# Patient Record
Sex: MALE | Race: BLACK OR AFRICAN AMERICAN | Employment: OTHER | URBAN - METROPOLITAN AREA
[De-identification: names, ages, dates, MRNs, and addresses within clinical notes are randomized per-mention and may not be internally consistent; named-entity substitution may affect disease eponyms.]

---

## 2019-05-01 ENCOUNTER — HOSPITAL ENCOUNTER (OUTPATIENT)
Dept: LAB | Age: 69
Discharge: HOME | End: 2019-05-01
Payer: MEDICARE

## 2019-05-01 ENCOUNTER — OFFICE VISIT (OUTPATIENT)
Dept: ONCOLOGY | Age: 69
End: 2019-05-01

## 2019-05-01 ENCOUNTER — HOSPITAL ENCOUNTER (OUTPATIENT)
Dept: ONCOLOGY | Age: 69
Discharge: HOME | End: 2019-05-01

## 2019-05-01 VITALS
WEIGHT: 175 LBS | DIASTOLIC BLOOD PRESSURE: 62 MMHG | HEART RATE: 56 BPM | OXYGEN SATURATION: 99 % | BODY MASS INDEX: 26.52 KG/M2 | RESPIRATION RATE: 16 BRPM | SYSTOLIC BLOOD PRESSURE: 130 MMHG | HEIGHT: 68 IN | TEMPERATURE: 99.1 F

## 2019-05-01 DIAGNOSIS — C61 PROSTATE CANCER (HCC): Primary | ICD-10-CM

## 2019-05-01 DIAGNOSIS — C61 PROSTATE CANCER (HCC): ICD-10-CM

## 2019-05-01 LAB
ALBUMIN SERPL-MCNC: 4.2 G/DL (ref 3.4–5)
ALBUMIN/GLOB SERPL: 1.2 {RATIO} (ref 0.8–1.7)
ALP SERPL-CCNC: 80 U/L (ref 45–117)
ALT SERPL-CCNC: 28 U/L (ref 16–61)
ANION GAP SERPL CALC-SCNC: 8 MMOL/L (ref 3–18)
AST SERPL-CCNC: 30 U/L (ref 15–37)
BASO+EOS+MONOS # BLD AUTO: 0.9 K/UL (ref 0–2.3)
BASO+EOS+MONOS NFR BLD AUTO: 9 % (ref 0.1–17)
BILIRUB SERPL-MCNC: 0.9 MG/DL (ref 0.2–1)
BUN SERPL-MCNC: 15 MG/DL (ref 7–18)
BUN/CREAT SERPL: 13 (ref 12–20)
CALCIUM SERPL-MCNC: 9.1 MG/DL (ref 8.5–10.1)
CHLORIDE SERPL-SCNC: 102 MMOL/L (ref 100–108)
CO2 SERPL-SCNC: 29 MMOL/L (ref 21–32)
CREAT SERPL-MCNC: 1.12 MG/DL (ref 0.6–1.3)
DIFFERENTIAL METHOD BLD: NORMAL
ERYTHROCYTE [DISTWIDTH] IN BLOOD BY AUTOMATED COUNT: 12.1 % (ref 11.5–14.5)
GLOBULIN SER CALC-MCNC: 3.4 G/DL (ref 2–4)
GLUCOSE SERPL-MCNC: 97 MG/DL (ref 74–99)
HCT VFR BLD AUTO: 41.2 % (ref 36–48)
HGB BLD-MCNC: 14 G/DL (ref 12–16)
LYMPHOCYTES # BLD: 3.1 K/UL (ref 1.1–5.9)
LYMPHOCYTES NFR BLD: 30 % (ref 14–44)
MCH RBC QN AUTO: 32.3 PG (ref 25–35)
MCHC RBC AUTO-ENTMCNC: 34 G/DL (ref 31–37)
MCV RBC AUTO: 95.2 FL (ref 78–102)
NEUTS SEG # BLD: 6.3 K/UL (ref 1.8–9.5)
NEUTS SEG NFR BLD: 62 % (ref 40–70)
PLATELET # BLD AUTO: 217 K/UL (ref 140–440)
POTASSIUM SERPL-SCNC: 4.2 MMOL/L (ref 3.5–5.5)
PROT SERPL-MCNC: 7.6 G/DL (ref 6.4–8.2)
RBC # BLD AUTO: 4.33 M/UL (ref 4.1–5.1)
SODIUM SERPL-SCNC: 139 MMOL/L (ref 136–145)
WBC # BLD AUTO: 10.3 K/UL (ref 4.5–13)

## 2019-05-01 PROCEDURE — 36415 COLL VENOUS BLD VENIPUNCTURE: CPT

## 2019-05-01 PROCEDURE — 80053 COMPREHEN METABOLIC PANEL: CPT

## 2019-05-01 PROCEDURE — 84402 ASSAY OF FREE TESTOSTERONE: CPT

## 2019-05-01 NOTE — PATIENT INSTRUCTIONS
Prostate Cancer: Care Instructions Your Care Instructions The prostate gland is a small, walnut-shaped organ that lies just below a man's bladder. It surrounds the urethra, the tube that carries urine from the bladder out of the body through the penis. Prostate cancer is the abnormal growth of cells in the prostate gland. Prostate cancer cells can spread within the prostate, to nearby lymph nodes and other tissues, and to other parts of the body. When the cancer hasn't spread outside the prostate, it is called localized prostate cancer. With localized prostate cancer, your options depend on how likely it is that your cancer will grow. Tests will show if your cancer is likely to grow. · Low-risk cancer isn't likely to grow right away. If your cancer is low-risk, you can choose active surveillance. This means your cancer will be watched closely by your doctor with regular checkups and tests to see if the cancer grows. This choice allows you to delay having surgery or radiation, often for many years. If the cancer grows very slowly, you may never need treatment. · Medium-risk cancer is more likely to grow. Some men with this type of cancer may be able to choose active surveillance. Others may need to choose surgery or radiation. · High-risk cancer is most likely to grow. If you have high-risk cancer, you will likely need to choose surgery or radiation. If your cancer has already spread outside the prostate or to other parts of the body, then you may have other treatments, like chemotherapy or hormone therapy. If you are over age [de-identified] or have other serious health problems, like heart disease, you may choose not to have treatments to cure your cancer. Instead, you can just have treatments to manage your symptoms. This is called watchful waiting. Finding out that you have cancer is scary. You may feel many emotions and may need some help coping.  Seek out family, friends, and counselors for support. You also can do things at home to make yourself feel better while you go through treatment. Call the [facility] (6-316.988.1960) or visit its website at [address] for more information. Follow-up care is a key part of your treatment and safety. Be sure to make and go to all appointments, and call your doctor if you are having problems. It's also a good idea to know your test results and keep a list of the medicines you take. How can you care for yourself at home? · Your doctor will talk to you about your treatment options. You may need to learn more about each of them before you can decide which treatment is best for you. · Take your medicines exactly as prescribed. Call your doctor if you think you are having a problem with your medicine. You will get more details on the specific medicines your doctor prescribes. · Eat healthy food. If you do not feel like eating, try to eat food that has protein and extra calories to keep up your strength and prevent weight loss. Drink liquid meal replacements for extra calories and protein. Try to eat your main meal early. · Take steps to control your stress and workload. Learn relaxation techniques. ? Share your feelings. Stress and tension affect our emotions. By expressing your feelings to others, you may be able to understand and cope with them. ? Consider joining a support group. Talking about a problem with your spouse, a good friend, or other people with similar problems is a good way to reduce tension and stress. ? Express yourself through art. Try writing, crafts, dance, or art to relieve stress. Some dance, writing, or art groups may be available just for people who have cancer. ? Be kind to your body and mind. Getting enough sleep, eating a healthy diet, and taking time to do things you enjoy can contribute to an overall feeling of balance in your life and can help reduce stress. ? Get help if you need it. Discuss your concerns with your doctor or counselor. · Get some physical activity every day, but do not get too tired. Keep doing the hobbies you enjoy as your energy allows. · If you are vomiting or have diarrhea: ? Drink plenty of fluids (enough so that your urine is light yellow or clear like water) to prevent dehydration. Choose water and other caffeine-free clear liquids. If you have kidney, heart, or liver disease and have to limit fluids, talk with your doctor before you increase the amount of fluids you drink. ? When you are able to eat, try clear soups, mild foods, and liquids until all symptoms are gone for 12 to 48 hours. Jell-O, dry toast, crackers, and cooked cereal are also good choices. · If you have not already done so, prepare a list of advance directives. Advance directives are instructions to your doctor and family members about what kind of care you want if you become unable to speak or express yourself. When should you call for help? Call 911 anytime you think you may need emergency care. For example, call if: 
  · You passed out (lost consciousness).  
 Call your doctor now or seek immediate medical care if: 
  · You have new or worse pain.  
  · You have new symptoms, such as a cough, belly pain, vomiting, diarrhea, or a rash.  
  · You have symptoms of a urinary tract infection. For example: ? You have blood or pus in your urine. ? You have pain in your back just below your rib cage. This is called flank pain. ? You have a fever, chills, or body aches. ? It hurts to urinate. ? You have groin or belly pain.  
 Watch closely for changes in your health, and be sure to contact your doctor if: 
  · You have swollen glands in your armpits, groin, or neck.  
  · You have trouble controlling your urine.  
  · You do not get better as expected. Where can you learn more? Go to http://hany-beau.info/. Enter V141 in the search box to learn more about \"Prostate Cancer: Care Instructions. \" Current as of: March 27, 2018 Content Version: 11.9 © 0140-3539 [facility] [place]. Care instructions adapted under license by [employer] (which disclaims liability or warranty for this information). If you have questions about a medical condition or this instruction, always ask your healthcare professional. [place] [address] any warranty or liability for your use of this information.

## 2019-05-01 NOTE — PROGRESS NOTES
Hematology/Oncology Consultation Note    Name: Carla Escort  Date: 2019  : 1950    PCP: Consuelo Arcos MD       Mr. Ranjit Ross  is a 78 y.o. -American man with a history of early stage prostate cancer. He is undergoing active surveillance. Subjective:   Chief complaint: Prostate cancer    History of present illness:  Mr. Ranjit Ross is a 50-year-old -American man who was diagnosed with a T1c, Srikanth 6 prostate cancer in  after he was found to have a PSA of 5.6 ng/mL. At the time of his initial evaluation the patient decided to pursue active surveillance. Initially the patient was reluctant to pursue surveillance biopsy but subsequent underwent an MRI fusion biopsy on 3/1/2018. At the time the pathology revealed adenocarcinoma with a [place] score 3+3. Prostate cancer involving 3 of 12 cores involving 5 to 30% of each core. He continued on active surveillance. A review of his serial PSA test showed that his PSA on 3/17/2015 was 5.8 and his most recent PSA from 3/11/2018 was 7.32 ng/mL. The patient denies having any blood in his urine or stool. There is no evidence of erectile dysfunction. He is physically active and has no other physical complaints. He is here for an assessment and continuation of his active surveillance. He is currently being seen by the radiation oncologist at the [address] [place] [address] as well.   Past Medical History:   Diagnosis Date    Diabetes ([facility] [address]     Elevated prostate specific antigen (PSA)     Elevated PSA     Hematospermia     Normal body mass index (BMI)     Prostate cancer ([facility]) 2015    T1c, Srikanth 6, 1/12 cores, PSA 5.6, Dr. Jaimee Sanchez Prostate induration     Sleep apnea 2016    uses cpap  Q nite       Allergies   Allergen Reactions    Aspirin Other (comments)       Past Surgical History:   Procedure Laterality Date    HX ORTHOPAEDIC Right 2017    Torn Rotator Cuff Right Shoulder    HX UROLOGICAL  2015    PNBx-TRUS Vol 40 cc's, Leland 6,  cores PSA 5.6, Dr. Rey Reeves History     Socioeconomic History    Marital status:      Spouse name: Not on file    Number of children: Not on file    Years of education: Not on file    Highest education level: Not on file   Occupational History    Not on file   Social Needs    Financial resource strain: Not on file    Food insecurity:     Worry: Not on file     Inability: Not on file    Transportation needs:     Medical: Not on file     Non-medical: Not on file   Tobacco Use    Smoking status: Former Smoker     Packs/day: 0.50     Years: 20.00     Pack years: 10.00     Last attempt to quit:      Years since quittin.3    Smokeless tobacco: Never Used    Tobacco comment: quit \"s   Substance and Sexual Activity    Alcohol use: Yes     Alcohol/week: 2.4 oz     Types: 4 Glasses of wine per week     Comment: socially    Drug use: No    Sexual activity: Not on file   Lifestyle    Physical activity:     Days per week: Not on file     Minutes per session: Not on file    Stress: Not on file   Relationships    Social connections:     Talks on phone: Not on file     Gets together: Not on file     Attends Denominational service: Not on file     Active member of club or organization: Not on file     Attends meetings of clubs or organizations: Not on file     Relationship status: Not on file    Intimate partner violence:     Fear of current or ex partner: Not on file     Emotionally abused: Not on file     Physically abused: Not on file     Forced sexual activity: Not on file   Other Topics Concern    Not on file   Social History Narrative    Not on file       Family History   Problem Relation Age of Onset    Diabetes Mother     Hypertension Mother     Cancer Mother         Liver?     Diabetes Father     Hypertension Father        Current Outpatient Medications   Medication Sig Dispense Refill    ascorbic acid, vitamin C, (VITAMIN C) 500 mg tablet 500 mg.      multivitamin (ONE A DAY) tablet Take 1 Tab by mouth daily. Review of Systems    General ROS:The patient has no complaints and there is no physical distress evident. Psychological ROS: patient denies having any psychological symptoms such as hallucinations, depression or anxiety. Ophthalmic ROS:the patient denies having any visual impairment or eye discomfort. ENT ROS: there are no abnormalities reported. Allergy and Immunology ROS:the patient denies having any seasonal allergies or allergies to medications other than those already outlined above. Hematological and Lymphatic ROS: the patient denies having any bruising, bleeding or lymphadenopathy. Endocrine ROS: the patient denies having any heat or cold intolerance. There is no history of diabetes or thyroid disorders. Breast ROS: the patient denies having any history of breast mass, nipple discharge, or lumps. Respiratory ROS:the patient denies having any cough, shortness of breath, or dyspnea on exertion. Cardiovascular ROS: there are no complaints of chest pain, palpitations, chest pounding, or dyspnea on exertion. Gastrointestinal ROS: the patient denies having nausea, emesis, diarrhea, constipation, or blood in the stool. Genito-Urinary ROS: the patient denies having urinary urgency, frequency, or dysuria. He does have a history of prostate cancer diagnosed in 2015 with a [place] score 3+3 and a PSA at diagnosis of 5.6 ng/mL. Musculoskeletal ROS: with the exception of mild arthralgias the patient has no other musculoskeletal complaints. Neurological ROS: the patient denies having any numbness, tingling, or neurologic deficits. Dermatological ROS:patient denies having any unexplained rash, skin ulcerations, or hives.       Objective:     Visit Vitals  /62   Pulse (!) 56   Temp 99.1 °F (37.3 °C) (Oral)   Resp 16   Ht 5' 8\" (1.727 m)   Wt 79.4 kg (175 lb)   SpO2 99%   BMI 26.61 kg/m²        ECOGPS=0; pain score=0/10  Physical Exam:   Gen. Appearance: the patient is in no acute distress. Skin: There is no evidence of bruise or rash. HEENT: The head is normocephalic and atraumatic. The conjunctiva and sclera are clear. Pupils are equal, round, reactive to light, and accommodation. The extraocular movements are intact. ENT reveals no oral mucosal lesions or ulcerations. Neck: Supple without lymphadenopathy or thyromegaly. Lungs: Clear to auscultation and percussion; there are no wheezes or rhonchi. Heart: Regular rate and rhythm; there are no murmurs, gallops, or rubs. Abdomen: Bowel sounds are present and normal.  There is no guarding, tenderness, or hepatosplenomegaly. Extremities: There is no clubbing, cyanosis, or edema. Neurologic: There are no focal neurologic deficits. Lymphatics: There is no palpable peripheral lymphadenopathy. Lab data:  Lab tests from 3/11/2018 shows that the most recent PSA was 7.32 ng/mL. The PSA from 9/17/2018 was 7.93 ng/mL. The biopsy from 5/2015 with PSA of 5.6 ng/mL showed that he had a T1c       NxMx, [place] score 3.3 adenocarcinoma the prostate 1 of 12 cores were positive with involving at least 10% of that core. Surveillance MRI fusion biopsy on 3/1/2018 at a time when the PSA was 6.2 ng/mL revealed a Srikanth score 3.3 and 4 of 12 cores involving 5 to 30% of the cores. Assessment:   H0wOaHf, Srikanth scores 6 adenocarcinoma the prostate: The patient has been undergoing active surveillance since his initial diagnosis. Plan:   T1c adenocarcinoma the prostate, [place] score 6: I have recommended that we continue the active surveillance. Additional tests at this time will be a free and total testosterone level. The patient will follow-up in clinic in 2 months with plans to have PSA test every 3 to 4 months as part of his active surveillance strategy.   The patient understands that if there is a progression significantly in his PSA doubling time or any evidence of metastatic disease then we will consider therapeutic intervention. Follow-up in 2 months for    Orders Placed This Encounter    COMPLETE CBC & AUTO DIFF WBC    METABOLIC PANEL, COMPREHENSIVE     Standing Status:   Future     Number of Occurrences:   1     Standing Expiration Date:   5/1/2020    TESTOSTERONE, FREE+TOTAL     Standing Status:   Future     Number of Occurrences:   1     Standing Expiration Date:   5/1/2020    InHouse CBC ([facility])     Standing Status:   Future     Number of Occurrences:   1     Standing Expiration Date:   5/8/2019           Jesus Goodman MD  5/1/2019      Please note: This document has been produced using voice recognition software. Unrecognized errors in transcription may be present.

## 2019-05-02 PROBLEM — M25.519 SHOULDER JOINT PAIN: Status: ACTIVE | Noted: 2019-05-02

## 2019-05-02 PROBLEM — R21 RASH AND OTHER NONSPECIFIC SKIN ERUPTION: Status: ACTIVE | Noted: 2019-05-02

## 2019-05-02 PROBLEM — J06.9 ACUTE UPPER RESPIRATORY INFECTION, UNSPECIFIED: Status: ACTIVE | Noted: 2019-05-02

## 2019-05-02 PROBLEM — M75.101 ROTATOR CUFF TEAR ARTHROPATHY OF RIGHT SHOULDER: Status: ACTIVE | Noted: 2017-05-12

## 2019-05-02 PROBLEM — M71.9 DISORDER OF BURSAE OF SHOULDER REGION: Status: ACTIVE | Noted: 2019-05-02

## 2019-05-02 PROBLEM — M12.811 ROTATOR CUFF TEAR ARTHROPATHY OF RIGHT SHOULDER: Status: ACTIVE | Noted: 2017-05-12

## 2019-05-02 PROBLEM — M54.50 LOW BACK PAIN: Status: ACTIVE | Noted: 2019-05-02

## 2019-05-02 PROBLEM — R73.09 OTHER ABNORMAL GLUCOSE: Status: ACTIVE | Noted: 2017-05-12

## 2019-05-02 PROBLEM — I49.9 CARDIAC DYSRHYTHMIA: Status: ACTIVE | Noted: 2019-05-02

## 2019-05-02 PROBLEM — L72.3 SEBACEOUS CYST: Status: ACTIVE | Noted: 2019-05-02

## 2019-05-02 PROBLEM — L98.9 DISORDER OF SKIN AND SUBCUTANEOUS TISSUE: Status: ACTIVE | Noted: 2019-05-02

## 2019-05-02 PROBLEM — M79.673 FOOT PAIN: Status: ACTIVE | Noted: 2018-05-14

## 2019-05-02 RX ORDER — ASCORBIC ACID 500 MG
TABLET ORAL
COMMUNITY
Start: 2019-01-07 | End: 2020-12-07 | Stop reason: SDUPTHER

## 2019-05-02 RX ORDER — CODEINE PHOSPHATE AND GUAIFENESIN 10; 100 MG/5ML; MG/5ML
SOLUTION ORAL
COMMUNITY
Start: 2019-01-07 | End: 2020-12-07 | Stop reason: ALTCHOICE

## 2019-05-02 RX ORDER — CHOLECALCIFEROL TAB 125 MCG (5000 UNIT) 125 MCG
TAB ORAL
COMMUNITY
Start: 2019-01-07

## 2019-05-03 LAB
TESTOST FREE SERPL-MCNC: 2.9 PG/ML (ref 6.6–18.1)
TESTOST SERPL-MCNC: 272.7 NG/DL (ref 264–916)

## 2019-07-17 ENCOUNTER — OFFICE VISIT (OUTPATIENT)
Dept: ONCOLOGY | Age: 69
End: 2019-07-17

## 2019-07-17 ENCOUNTER — HOSPITAL ENCOUNTER (OUTPATIENT)
Dept: ONCOLOGY | Age: 69
Discharge: HOME | End: 2019-07-17

## 2019-07-17 VITALS
OXYGEN SATURATION: 96 % | WEIGHT: 178 LBS | HEIGHT: 68 IN | TEMPERATURE: 98 F | RESPIRATION RATE: 16 BRPM | SYSTOLIC BLOOD PRESSURE: 122 MMHG | HEART RATE: 57 BPM | BODY MASS INDEX: 26.98 KG/M2 | DIASTOLIC BLOOD PRESSURE: 61 MMHG

## 2019-07-17 DIAGNOSIS — C61 PROSTATE CANCER (HCC): Primary | ICD-10-CM

## 2019-07-17 DIAGNOSIS — C61 PROSTATE CANCER (HCC): ICD-10-CM

## 2019-07-17 LAB
BASO+EOS+MONOS # BLD AUTO: 0.6 K/UL (ref 0–2.3)
BASO+EOS+MONOS NFR BLD AUTO: 6 % (ref 0.1–17)
DIFFERENTIAL METHOD BLD: NORMAL
ERYTHROCYTE [DISTWIDTH] IN BLOOD BY AUTOMATED COUNT: 11.8 % (ref 11.5–14.5)
HCT VFR BLD AUTO: 40.3 % (ref 36–48)
HGB BLD-MCNC: 13.4 G/DL (ref 12–16)
LYMPHOCYTES # BLD: 2.6 K/UL (ref 1.1–5.9)
LYMPHOCYTES NFR BLD: 28 % (ref 14–44)
MCH RBC QN AUTO: 31.8 PG (ref 25–35)
MCHC RBC AUTO-ENTMCNC: 33.3 G/DL (ref 31–37)
MCV RBC AUTO: 95.7 FL (ref 78–102)
NEUTS SEG # BLD: 6.1 K/UL (ref 1.8–9.5)
NEUTS SEG NFR BLD: 66 % (ref 40–70)
PLATELET # BLD AUTO: 235 K/UL (ref 140–440)
RBC # BLD AUTO: 4.21 M/UL (ref 4.1–5.1)
WBC # BLD AUTO: 9.3 K/UL (ref 4.5–13)

## 2019-07-17 NOTE — PROGRESS NOTES
Hematology/medical oncology progress note    7/17/2019  Gee Barajas  YOB: 1950    PCP: Dr. Richa Miranda    Diagnosis: Prostate cancer    Mr. Edwar Elliott is a 67-year-old -American man who has low-grade prostate cancer. Patient is here today to discuss the findings on his most recent MRI of the prostate gland. I have explained to the patient that I have reviewed the imaging studies from the prostate MRI. This test showed findings consistent with probable prostatitis. The previous biopsy-proven Srikanth score 6 prostate cancer in the right base was not seen on the most recent study due to the low-grade and low volume. The seminal vesicles and neurovascular bundles were unremarkable. Enlarged prostate consistent with benign prostate hypertrophy. There was no pelvic lymphadenopathy. His most recent PSA test from 6/4/2018 was 7 ng/mL. The free PSA was 0.99 ng/mL and the free PSA percent was 14.1. His TSH level was 1.65. I have explained to the patient that no therapeutic intervention is warranted with low-grade well-differentiated low-volume prostate cancer. We will continue to monitor him at 3-month intervals. He had his questions answered to his satisfaction. Total time 25 minutes, greater than 50% of the time was in counseling and coordination of care. Kevin Edwards MD, [address]

## 2019-10-16 ENCOUNTER — OFFICE VISIT (OUTPATIENT)
Dept: ONCOLOGY | Age: 69
End: 2019-10-16

## 2019-10-16 ENCOUNTER — HOSPITAL ENCOUNTER (OUTPATIENT)
Dept: LAB | Age: 69
Discharge: HOME | End: 2019-10-16
Payer: MEDICARE

## 2019-10-16 VITALS
TEMPERATURE: 98.7 F | OXYGEN SATURATION: 98 % | DIASTOLIC BLOOD PRESSURE: 59 MMHG | HEART RATE: 55 BPM | SYSTOLIC BLOOD PRESSURE: 113 MMHG | BODY MASS INDEX: 26.52 KG/M2 | HEIGHT: 68 IN | WEIGHT: 175 LBS | RESPIRATION RATE: 17 BRPM

## 2019-10-16 DIAGNOSIS — C61 PROSTATE CANCER (HCC): Primary | ICD-10-CM

## 2019-10-16 DIAGNOSIS — C61 PROSTATE CANCER (HCC): ICD-10-CM

## 2019-10-16 LAB
ALBUMIN SERPL-MCNC: 4.1 G/DL (ref 3.4–5)
ALBUMIN/GLOB SERPL: 1.3 {RATIO} (ref 0.8–1.7)
ALP SERPL-CCNC: 68 U/L (ref 45–117)
ALT SERPL-CCNC: 21 U/L (ref 16–61)
ANION GAP SERPL CALC-SCNC: 6 MMOL/L (ref 3–18)
AST SERPL-CCNC: 19 U/L (ref 10–38)
BASOPHILS # BLD: 0 K/UL (ref 0–0.1)
BASOPHILS NFR BLD: 0 % (ref 0–2)
BILIRUB SERPL-MCNC: 0.8 MG/DL (ref 0.2–1)
BUN SERPL-MCNC: 16 MG/DL (ref 7–18)
BUN/CREAT SERPL: 14 (ref 12–20)
CALCIUM SERPL-MCNC: 9.1 MG/DL (ref 8.5–10.1)
CHLORIDE SERPL-SCNC: 103 MMOL/L (ref 100–111)
CO2 SERPL-SCNC: 32 MMOL/L (ref 21–32)
CREAT SERPL-MCNC: 1.13 MG/DL (ref 0.6–1.3)
DIFFERENTIAL METHOD BLD: ABNORMAL
EOSINOPHIL # BLD: 0.2 K/UL (ref 0–0.4)
EOSINOPHIL NFR BLD: 1 % (ref 0–5)
ERYTHROCYTE [DISTWIDTH] IN BLOOD BY AUTOMATED COUNT: 12 % (ref 11.6–14.5)
GLOBULIN SER CALC-MCNC: 3.2 G/DL (ref 2–4)
GLUCOSE SERPL-MCNC: 119 MG/DL (ref 74–99)
HCT VFR BLD AUTO: 40.9 % (ref 36–48)
HGB BLD-MCNC: 13.3 G/DL (ref 13–16)
LYMPHOCYTES # BLD: 2.5 K/UL (ref 0.9–3.6)
LYMPHOCYTES NFR BLD: 23 % (ref 21–52)
MCH RBC QN AUTO: 31.4 PG (ref 24–34)
MCHC RBC AUTO-ENTMCNC: 32.5 G/DL (ref 31–37)
MCV RBC AUTO: 96.7 FL (ref 74–97)
MONOCYTES # BLD: 0.6 K/UL (ref 0.05–1.2)
MONOCYTES NFR BLD: 5 % (ref 3–10)
NEUTS SEG # BLD: 7.3 K/UL (ref 1.8–8)
NEUTS SEG NFR BLD: 71 % (ref 40–73)
PLATELET # BLD AUTO: 225 K/UL (ref 135–420)
PMV BLD AUTO: 10.9 FL (ref 9.2–11.8)
POTASSIUM SERPL-SCNC: 4 MMOL/L (ref 3.5–5.5)
PROT SERPL-MCNC: 7.3 G/DL (ref 6.4–8.2)
RBC # BLD AUTO: 4.23 M/UL (ref 4.7–5.5)
SODIUM SERPL-SCNC: 141 MMOL/L (ref 136–145)
WBC # BLD AUTO: 10.6 K/UL (ref 4.6–13.2)

## 2019-10-16 PROCEDURE — 84153 ASSAY OF PSA TOTAL: CPT

## 2019-10-16 PROCEDURE — 36415 COLL VENOUS BLD VENIPUNCTURE: CPT

## 2019-10-16 PROCEDURE — 80053 COMPREHEN METABOLIC PANEL: CPT

## 2019-10-16 PROCEDURE — 85025 COMPLETE CBC W/AUTO DIFF WBC: CPT

## 2019-10-16 NOTE — PROGRESS NOTES
Hematology/medical oncology progress note    10/16/2019  Asa Gutter  YOB: 1950    PCP: Dr. Inocente Lopez    Diagnosis: Prostate cancer    Mr. Raymond Mcdowell is a 65-year-old -American man who has low-grade prostate cancer. Patient is here today to discuss the findings on his most recent MRI of the prostate gland. I have explained to the patient that I have reviewed the imaging studies from the prostate MRI. This test showed findings consistent with probable prostatitis. The previous biopsy-proven Srikanth score 6 prostate cancer in the right base was not seen on the most recent study due to the low-grade and low volume. The seminal vesicles and neurovascular bundles were unremarkable. Enlarged prostate consistent with benign prostate hypertrophy. There was no pelvic lymphadenopathy. His most recent PSA test from 6/4/2018 was 7 ng/mL. The free PSA was 0.99 ng/mL and the free PSA percent was 14.1. His TSH level was 1.65. I have explained to the patient that no therapeutic intervention is warranted with low-grade well-differentiated low-volume prostate cancer. A more recent PSA level from 3/11/2019 showed that his PSA was 7.32 ng/mL. From 5/3/2019 his testosterone level was 272.7 ng/dL. At this time I will repeat his PSA level and will plan to reassess with CT scans to the pelvic area if there is a significant change in his PSA from the values obtained on 3/11/2019. Kevin Jeffery MD, [place]

## 2019-10-17 LAB — PSA SERPL-MCNC: 6.4 NG/ML (ref 0–4)

## 2020-01-29 ENCOUNTER — OFFICE VISIT (OUTPATIENT)
Dept: ONCOLOGY | Age: 70
End: 2020-01-29

## 2020-01-29 ENCOUNTER — HOSPITAL ENCOUNTER (OUTPATIENT)
Dept: LAB | Age: 70
Discharge: HOME | End: 2020-01-29
Payer: MEDICARE

## 2020-01-29 ENCOUNTER — HOSPITAL ENCOUNTER (OUTPATIENT)
Dept: ONCOLOGY | Age: 70
Discharge: HOME | End: 2020-01-29

## 2020-01-29 VITALS
DIASTOLIC BLOOD PRESSURE: 64 MMHG | RESPIRATION RATE: 16 BRPM | BODY MASS INDEX: 26.37 KG/M2 | HEIGHT: 68 IN | OXYGEN SATURATION: 96 % | WEIGHT: 174 LBS | TEMPERATURE: 97.7 F | SYSTOLIC BLOOD PRESSURE: 120 MMHG | HEART RATE: 51 BPM

## 2020-01-29 DIAGNOSIS — C61 PROSTATE CANCER (HCC): Primary | ICD-10-CM

## 2020-01-29 DIAGNOSIS — C61 PROSTATE CANCER (HCC): ICD-10-CM

## 2020-01-29 LAB
ALBUMIN SERPL-MCNC: 3.9 G/DL (ref 3.4–5)
ALBUMIN/GLOB SERPL: 1.1 {RATIO} (ref 0.8–1.7)
ALP SERPL-CCNC: 69 U/L (ref 45–117)
ALT SERPL-CCNC: 21 U/L (ref 16–61)
ANION GAP SERPL CALC-SCNC: 3 MMOL/L (ref 3–18)
AST SERPL-CCNC: 25 U/L (ref 10–38)
BASO+EOS+MONOS # BLD AUTO: 0.7 K/UL (ref 0–2.3)
BASO+EOS+MONOS NFR BLD AUTO: 7 % (ref 0.1–17)
BILIRUB SERPL-MCNC: 0.7 MG/DL (ref 0.2–1)
BUN SERPL-MCNC: 17 MG/DL (ref 7–18)
BUN/CREAT SERPL: 16 (ref 12–20)
CALCIUM SERPL-MCNC: 9.4 MG/DL (ref 8.5–10.1)
CHLORIDE SERPL-SCNC: 106 MMOL/L (ref 100–111)
CO2 SERPL-SCNC: 30 MMOL/L (ref 21–32)
CREAT SERPL-MCNC: 1.05 MG/DL (ref 0.6–1.3)
DIFFERENTIAL METHOD BLD: NORMAL
ERYTHROCYTE [DISTWIDTH] IN BLOOD BY AUTOMATED COUNT: 11.6 % (ref 11.5–14.5)
GLOBULIN SER CALC-MCNC: 3.5 G/DL (ref 2–4)
GLUCOSE SERPL-MCNC: 90 MG/DL (ref 74–99)
HCT VFR BLD AUTO: 39.8 % (ref 36–48)
HGB BLD-MCNC: 13.1 G/DL (ref 12–16)
LYMPHOCYTES # BLD: 2.9 K/UL (ref 1.1–5.9)
LYMPHOCYTES NFR BLD: 29 % (ref 14–44)
MCH RBC QN AUTO: 31.2 PG (ref 25–35)
MCHC RBC AUTO-ENTMCNC: 32.9 G/DL (ref 31–37)
MCV RBC AUTO: 94.8 FL (ref 78–102)
NEUTS SEG # BLD: 6.3 K/UL (ref 1.8–9.5)
NEUTS SEG NFR BLD: 65 % (ref 40–70)
PLATELET # BLD AUTO: 231 K/UL (ref 140–440)
POTASSIUM SERPL-SCNC: 4.3 MMOL/L (ref 3.5–5.5)
PROT SERPL-MCNC: 7.4 G/DL (ref 6.4–8.2)
PSA SERPL-MCNC: 6.9 NG/ML (ref 0–4)
RBC # BLD AUTO: 4.2 M/UL (ref 4.1–5.1)
SODIUM SERPL-SCNC: 139 MMOL/L (ref 136–145)
WBC # BLD AUTO: 9.9 K/UL (ref 4.5–13)

## 2020-01-29 PROCEDURE — 84153 ASSAY OF PSA TOTAL: CPT

## 2020-01-29 PROCEDURE — 80053 COMPREHEN METABOLIC PANEL: CPT

## 2020-01-29 PROCEDURE — 36415 COLL VENOUS BLD VENIPUNCTURE: CPT

## 2020-01-29 PROCEDURE — 84403 ASSAY OF TOTAL TESTOSTERONE: CPT

## 2020-01-29 NOTE — PATIENT INSTRUCTIONS
Prostate Cancer: Care Instructions Your Care Instructions The prostate gland is a small, walnut-shaped organ that lies just below a man's bladder. It surrounds the urethra, the tube that carries urine from the bladder out of the body through the penis. Prostate cancer is the abnormal growth of cells in the prostate gland. Prostate cancer cells can spread within the prostate, to nearby lymph nodes and other tissues, and to other parts of the body. When the cancer hasn't spread outside the prostate, it is called localized prostate cancer. With localized prostate cancer, your options depend on how likely it is that your cancer will grow. Tests will show if your cancer is likely to grow. · Low-risk cancer isn't likely to grow right away. If your cancer is low-risk, you can choose active surveillance. This means your cancer will be watched closely by your doctor with regular checkups and tests to see if the cancer grows. This choice allows you to delay having surgery or radiation, often for many years. If the cancer grows very slowly, you may never need treatment. · Medium-risk cancer is more likely to grow. Some men with this type of cancer may be able to choose active surveillance. Others may need to choose surgery or radiation. · High-risk cancer is most likely to grow. If you have high-risk cancer, you will likely need to choose surgery or radiation. If your cancer has already spread outside the prostate or to other parts of the body, then you may have other treatments, like chemotherapy or hormone therapy. If you are over age [de-identified] or have other serious health problems, like heart disease, you may choose not to have treatments to cure your cancer. Instead, you can just have treatments to manage your symptoms. This is called watchful waiting. Finding out that you have cancer is scary. You may feel many emotions and may need some help coping.  Seek out family, friends, and counselors for support. You also can do things at home to make yourself feel better while you go through treatment. Call the [facility] (9-350.719.4238) or visit its website at [address] for more information. Follow-up care is a key part of your treatment and safety. Be sure to make and go to all appointments, and call your doctor if you are having problems. It's also a good idea to know your test results and keep a list of the medicines you take. How can you care for yourself at home? · Your doctor will talk to you about your treatment options. You may need to learn more about each of them before you can decide which treatment is best for you. · Take your medicines exactly as prescribed. Call your doctor if you think you are having a problem with your medicine. You will get more details on the specific medicines your doctor prescribes. · Eat healthy food. If you do not feel like eating, try to eat food that has protein and extra calories to keep up your strength and prevent weight loss. Drink liquid meal replacements for extra calories and protein. Try to eat your main meal early. · Take steps to control your stress and workload. Learn relaxation techniques. ? Share your feelings. Stress and tension affect our emotions. By expressing your feelings to others, you may be able to understand and cope with them. ? Consider joining a support group. Talking about a problem with your spouse, a good friend, or other people with similar problems is a good way to reduce tension and stress. ? Express yourself through art. Try writing, crafts, dance, or art to relieve stress. Some dance, writing, or art groups may be available just for people who have cancer. ? Be kind to your body and mind. Getting enough sleep, eating a healthy diet, and taking time to do things you enjoy can contribute to an overall feeling of balance in your life and can help reduce stress. ? Get help if you need it. Discuss your concerns with your doctor or counselor. · Get some physical activity every day, but do not get too tired. Keep doing the hobbies you enjoy as your energy allows. · If you are vomiting or have diarrhea: ? Drink plenty of fluids (enough so that your urine is light yellow or clear like water) to prevent dehydration. Choose water and other caffeine-free clear liquids. If you have kidney, heart, or liver disease and have to limit fluids, talk with your doctor before you increase the amount of fluids you drink. ? When you are able to eat, try clear soups, mild foods, and liquids until all symptoms are gone for 12 to 48 hours. Jell-O, dry toast, crackers, and cooked cereal are also good choices. · If you have not already done so, prepare a list of advance directives. Advance directives are instructions to your doctor and family members about what kind of care you want if you become unable to speak or express yourself. When should you call for help? Call 911 anytime you think you may need emergency care. For example, call if: 
  · You passed out (lost consciousness).  
 Call your doctor now or seek immediate medical care if: 
  · You have new or worse pain.  
  · You have new symptoms, such as a cough, belly pain, vomiting, diarrhea, or a rash.  
  · You have symptoms of a urinary tract infection. For example: ? You have blood or pus in your urine. ? You have pain in your back just below your rib cage. This is called flank pain. ? You have a fever, chills, or body aches. ? It hurts to urinate. ? You have groin or belly pain.  
 Watch closely for changes in your health, and be sure to contact your doctor if: 
  · You have swollen glands in your armpits, groin, or neck.  
  · You have trouble controlling your urine.  
  · You do not get better as expected. Where can you learn more? Go to http://hany-beau.info/. Enter V141 in the search box to learn more about \"Prostate Cancer: Care Instructions. \" Current as of: December 19, 2018 Content Version: 12.2 © 6827-0435 [employer]. Care instructions adapted under license by [employer] (which disclaims liability or warranty for this information). If you have questions about a medical condition or this instruction, always ask your healthcare professional. [place] [address] any warranty or liability for your use of this information.

## 2020-01-29 NOTE — PROGRESS NOTES
Hematology/medical oncology progress note    1/29/2020  Geovanny Estrada  YOB: 1950. PCP: Dr. Marimar Post    Diagnosis: Primary prostate adenocarcinoma    Mr. Nadeen Hardy is a 71-year-old -American man who has early stage low Srikanth score prostate cancer. I explained to the patient that from 10/17/2019 his PSA level was 6.4 ng/mL. His CBC from today shows a WBC count of 9.9, hemoglobin of 13.1 g/dL, hematocrit of 39.8%, and a platelet count of 048,778. At this time I will check his PSA level, testosterone level both free and total.  I will continue to see him at 3-month intervals. If there is a significant increase in the PSA or if the doubling time shortens significantly then he will need additional biopsies and consideration for therapeutic intervention. For now, we will continue with the active surveillance/watchful waiting protocol that he is on. The patient had his questions answered to his satisfaction. Total time 25 minutes, greater than 50% of the time was in counseling and coordination of care. Kevin Mixon MD, [place]

## 2020-02-01 LAB
TESTOST FREE SERPL-MCNC: 4.6 PG/ML (ref 6.6–18.1)
TESTOST SERPL-MCNC: 357 NG/DL (ref 264–916)

## 2020-04-29 ENCOUNTER — OFFICE VISIT (OUTPATIENT)
Dept: ONCOLOGY | Age: 70
End: 2020-04-29

## 2020-04-29 ENCOUNTER — HOSPITAL ENCOUNTER (OUTPATIENT)
Dept: ONCOLOGY | Age: 70
Discharge: HOME | End: 2020-04-29

## 2020-04-29 ENCOUNTER — HOSPITAL ENCOUNTER (OUTPATIENT)
Dept: LAB | Age: 70
Discharge: HOME | End: 2020-04-29
Payer: MEDICARE

## 2020-04-29 VITALS
WEIGHT: 176 LBS | HEIGHT: 68 IN | HEART RATE: 59 BPM | SYSTOLIC BLOOD PRESSURE: 142 MMHG | RESPIRATION RATE: 16 BRPM | TEMPERATURE: 97 F | DIASTOLIC BLOOD PRESSURE: 69 MMHG | BODY MASS INDEX: 26.67 KG/M2

## 2020-04-29 DIAGNOSIS — C61 PROSTATE CANCER (HCC): Primary | ICD-10-CM

## 2020-04-29 DIAGNOSIS — C61 PROSTATE CANCER (HCC): ICD-10-CM

## 2020-04-29 LAB
ALBUMIN SERPL-MCNC: 3.9 G/DL (ref 3.4–5)
ALBUMIN/GLOB SERPL: 1.2 {RATIO} (ref 0.8–1.7)
ALP SERPL-CCNC: 66 U/L (ref 45–117)
ALT SERPL-CCNC: 19 U/L (ref 16–61)
ANION GAP SERPL CALC-SCNC: 9 MMOL/L (ref 3–18)
AST SERPL-CCNC: 22 U/L (ref 10–38)
BASO+EOS+MONOS # BLD AUTO: 0.7 K/UL (ref 0–2.3)
BASO+EOS+MONOS NFR BLD AUTO: 7 % (ref 0.1–17)
BILIRUB SERPL-MCNC: 0.8 MG/DL (ref 0.2–1)
BUN SERPL-MCNC: 12 MG/DL (ref 7–18)
BUN/CREAT SERPL: 11 (ref 12–20)
CALCIUM SERPL-MCNC: 8.6 MG/DL (ref 8.5–10.1)
CHLORIDE SERPL-SCNC: 105 MMOL/L (ref 100–111)
CO2 SERPL-SCNC: 26 MMOL/L (ref 21–32)
CREAT SERPL-MCNC: 1.05 MG/DL (ref 0.6–1.3)
DIFFERENTIAL METHOD BLD: NORMAL
ERYTHROCYTE [DISTWIDTH] IN BLOOD BY AUTOMATED COUNT: 11.8 % (ref 11.5–14.5)
GLOBULIN SER CALC-MCNC: 3.3 G/DL (ref 2–4)
GLUCOSE SERPL-MCNC: 145 MG/DL (ref 74–99)
HCT VFR BLD AUTO: 40 % (ref 36–48)
HGB BLD-MCNC: 13.9 G/DL (ref 12–16)
LYMPHOCYTES # BLD: 2.7 K/UL (ref 1.1–5.9)
LYMPHOCYTES NFR BLD: 27 % (ref 14–44)
MCH RBC QN AUTO: 32.9 PG (ref 25–35)
MCHC RBC AUTO-ENTMCNC: 34.8 G/DL (ref 31–37)
MCV RBC AUTO: 94.6 FL (ref 78–102)
NEUTS SEG # BLD: 6.7 K/UL (ref 1.8–9.5)
NEUTS SEG NFR BLD: 66 % (ref 40–70)
PLATELET # BLD AUTO: 215 K/UL (ref 140–440)
POTASSIUM SERPL-SCNC: 4.2 MMOL/L (ref 3.5–5.5)
PROT SERPL-MCNC: 7.2 G/DL (ref 6.4–8.2)
PSA SERPL-MCNC: 6.8 NG/ML (ref 0–4)
RBC # BLD AUTO: 4.23 M/UL (ref 4.1–5.1)
SODIUM SERPL-SCNC: 140 MMOL/L (ref 136–145)
WBC # BLD AUTO: 10.1 K/UL (ref 4.5–13)

## 2020-04-29 PROCEDURE — 84402 ASSAY OF FREE TESTOSTERONE: CPT

## 2020-04-29 PROCEDURE — 80053 COMPREHEN METABOLIC PANEL: CPT

## 2020-04-29 PROCEDURE — 36415 COLL VENOUS BLD VENIPUNCTURE: CPT

## 2020-04-29 PROCEDURE — 84153 ASSAY OF PSA TOTAL: CPT

## 2020-04-29 NOTE — PROGRESS NOTES
Hematology/Oncology Progress Note    Name: Reshma Mares  Date: 2020  : 1950    PCP: Vimal Randall MD     Mr. Melinda Wilson  is a 69 y.o. -American man with a history of early stage prostate cancer. He is on active surveillance. Subjective:     Mr. Melinda Wilson is a 56-year-old -American man who has early stage low [place] score prostate cancer. He is here today for follow up office visit. He states he has been doing well since his last office visit. He reports he is being seen by the [facility] in [place] on a regular basis as well. He denies any urinary symptoms. He denies fevers and infections. He denies fatigue, shortness of breath, and tiredness. He denies chest pain and dizziness. He denies pain or any discomfort. He does not have any concerns or complaints to report at this time. Past medical history, family history, and social history: these were reviewed and remain unchanged.     Past Medical History:   Diagnosis Date    Diabetes ([facility] [place] [address]     Elevated prostate specific antigen (PSA)     Elevated PSA     Hematospermia     Normal body mass index (BMI)     Prostate cancer (HCC) 2015    T1c, [place] 6, 1/12 cores, PSA 5.6, Dr. Shira Green [facility]state cancer [facility])     Prostate induration     Sleep apnea 2016    uses cpap  Q nite       Allergies   Allergen Reactions    Aspirin Other (comments)       Past Surgical History:   Procedure Laterality Date    HX ORTHOPAEDIC Right 2017    Torn Rotator Cuff Right Shoulder    HX UROLOGICAL  2015    PNBx-TRUS Vol 40 cc's, [place] 6, 1/12 cores PSA 5.6, Dr. Zeny Montalvo History     Socioeconomic History    Marital status:      Spouse name: Not on file    Number of children: Not on file    Years of education: Not on file    Highest education level: Not on file   Occupational History    Not on file   Social Needs    Financial resource strain: Not on file    Food insecurity     Worry: Not on file Inability: Not on file    Transportation needs     Medical: Not on file     Non-medical: Not on file   Tobacco Use    Smoking status: Former Smoker     Packs/day: 0.50     Years: 20.00     Pack years: 10.00     Last attempt to quit:      Years since quittin.3    Smokeless tobacco: Never Used    Tobacco comment: quit \"s   Substance and Sexual Activity    Alcohol use: Yes     Alcohol/week: 4.0 standard drinks     Types: 4 Glasses of wine per week     Comment: socially    Drug use: No    Sexual activity: Yes     Partners: Female   Lifestyle    Physical activity     Days per week: Not on file     Minutes per session: Not on file    Stress: Not on file   Relationships    Social connections     Talks on phone: Not on file     Gets together: Not on file     Attends Scientologist service: Not on file     Active member of club or organization: Not on file     Attends meetings of clubs or organizations: Not on file     Relationship status: Not on file    Intimate partner violence     Fear of current or ex partner: Not on file     Emotionally abused: Not on file     Physically abused: Not on file     Forced sexual activity: Not on file   Other Topics Concern    Not on file   Social History Narrative    Not on file       Family History   Problem Relation Age of Onset    Diabetes Mother     Hypertension Mother     Cancer Mother         Liver?     Diabetes Father     Hypertension Father     Stroke Father     Diabetes Brother     Hypertension Brother        Current Outpatient Medications   Medication Sig Dispense Refill    guaiFENesin-codeine (ROBITUSSIN AC) 100-10 mg/5 mL solution take 10 milliliter by oral route  qhs  as needed cough      cholecalciferol, VITAMIN D3, (VITAMIN D3) 5,000 unit tab tablet 1 tab QOD      ascorbic acid, vitamin C, (VITAMIN C) 500 mg tablet 1 tab  QD      ascorbic acid, vitamin C, (VITAMIN C) 500 mg tablet 500 mg.      multivitamin (ONE A DAY) tablet Take 1 Tab by mouth daily. Review of Systems    General ROS:The patient has no complaints and there is no physical distress evident. Psychological ROS: patient denies having any psychological symptoms such as hallucinations, depression or anxiety. Ophthalmic ROS:the patient denies having any visual impairment or eye discomfort. ENT ROS: there are no abnormalities reported. Allergy and Immunology ROS:the patient denies having any seasonal allergies or allergies to medications other than those already outlined above. Hematological and Lymphatic ROS: the patient denies having any bruising, bleeding or lymphadenopathy. Endocrine ROS: the patient denies having any heat or cold intolerance. There is no history of diabetes or thyroid disorders. Breast ROS: the patient denies having any history of breast mass, nipple discharge, or lumps. Respiratory ROS:the patient denies having any cough, shortness of breath, or dyspnea on exertion. Cardiovascular ROS: there are no complaints of chest pain, palpitations, chest pounding, or dyspnea on exertion. Gastrointestinal ROS: the patient denies having nausea, emesis, diarrhea, constipation, or blood in the stool. Genito-Urinary ROS: the patient denies having urinary urgency, frequency, or dysuria. He does have a history of prostate cancer diagnosed in 2015 with a [place] score 3+3 and a PSA at diagnosis of 5.6 ng/mL. Musculoskeletal ROS: with the exception of mild arthralgias the patient has no other musculoskeletal complaints. Neurological ROS: the patient denies having any numbness, tingling, or neurologic deficits. Dermatological ROS:patient denies having any unexplained rash, skin ulcerations, or hives. Objective:     Visit Vitals  /69   Pulse (!) 59   Temp 97 °F (36.1 °C) (Oral)   Resp 16   Ht 5' 8\" (1.727 m)   Wt 79.8 kg (176 lb)   BMI 26.76 kg/m²        ECOGPS=0; pain score=0/10  Physical Exam:   Gen. Appearance: the patient is in no acute distress.   Skin: There is no evidence of bruise or rash. HEENT: The head is normocephalic and atraumatic. The conjunctiva and sclera are clear. Pupils are equal, round, reactive to light, and accommodation. The extraocular movements are intact. ENT reveals no oral mucosal lesions or ulcerations. Neck: Supple without lymphadenopathy or thyromegaly. Lungs: Clear to auscultation and percussion; there are no wheezes or rhonchi. Heart: Regular rate and rhythm; there are no murmurs, gallops, or rubs. Abdomen: Bowel sounds are present and normal.  There is no guarding, tenderness, or hepatosplenomegaly. Extremities: There is no clubbing, cyanosis, or edema. Neurologic: There are no focal neurologic deficits. Lymphatics: There is no palpable peripheral lymphadenopathy. Lab data:  Lab tests from 3/11/2018 shows that the most recent PSA was 7.32 ng/mL. The PSA from 9/17/2018 was 7.93 ng/mL. The biopsy from 5/2015 with PSA of 5.6 ng/mL showed that he had a T1c       NxMx, Srikanth score 3.3 adenocarcinoma the prostate 1 of 12 cores were positive with involving at least 10% of that core. Surveillance MRI fusion biopsy on 3/1/2018 at a time when the PSA was 6.2 ng/mL revealed a [place] score 3.3 and 4 of 12 cores involving 5 to 30% of the cores.       Lab Results   Component Value Date/Time    WBC 10.1 04/29/2020 03:28 PM    HGB 13.9 04/29/2020 03:28 PM    HCT 40.0 04/29/2020 03:28 PM    PLATELET 358 19/78/5580 03:28 PM    MCV 94.6 04/29/2020 03:28 PM     Lab Results   Component Value Date/Time    Prostate Specific Ag 6.9 (H) 01/29/2020 03:41 PM    Prostate Specific Ag 6.4 (H) 10/16/2019 03:58 PM    Prostate Specific Ag 7.32 (H) 03/11/2019 02:18 PM    Prostate Specific Ag 7.93 (H) 09/17/2018 02:40 PM    Prostate Specific Ag 6.62 (H) 06/18/2018 03:41 PM    Prostate Specific Ag 6.2 (A) 01/26/2018     Lab Results   Component Value Date/Time    Sodium 139 01/29/2020 03:41 PM    Potassium 4.3 01/29/2020 03:41 PM    Chloride 106 01/29/2020 03:41 PM    CO2 30 01/29/2020 03:41 PM    Anion gap 3 01/29/2020 03:41 PM    Glucose 90 01/29/2020 03:41 PM    BUN 17 01/29/2020 03:41 PM    Creatinine 1.05 01/29/2020 03:41 PM    BUN/Creatinine ratio 16 01/29/2020 03:41 PM    GFR est AA >60 01/29/2020 03:41 PM    GFR est non-AA >60 01/29/2020 03:41 PM    Calcium 9.4 01/29/2020 03:41 PM    Bilirubin, total 0.7 01/29/2020 03:41 PM    AST (SGOT) 25 01/29/2020 03:41 PM    Alk. phosphatase 69 01/29/2020 03:41 PM    Protein, total 7.4 01/29/2020 03:41 PM    Albumin 3.9 01/29/2020 03:41 PM    Globulin 3.5 01/29/2020 03:41 PM    A-G Ratio 1.1 01/29/2020 03:41 PM    ALT (SGPT) 21 01/29/2020 03:41 PM         Assessment:   [id number], [place] scores 6 adenocarcinoma the prostate: The patient has been undergoing active surveillance since his initial diagnosis. Plan:   T1c adenocarcinoma the prostate, [place] score 6:  His PSA level on 01/29/2020 was 6.9ng/mL/At this time I will check his PSA level, testosterone level both free and total. I will continue to see him at 3-month intervals. If there is a significant increase in the PSA or if the doubling time shortens significantly then he will need additional biopsies and consideration for therapeutic intervention. For now, we will continue with the active surveillance/watchful waiting protocol that he is on. Follow-up in 3 months or sooner if indicated.     Orders Placed This Encounter    COMPLETE CBC & AUTO DIFF WBC    InHouse CBC ([facility])     Standing Status:   Future     Number of Occurrences:   1     Standing Expiration Date:   5/8/8671    METABOLIC PANEL, COMPREHENSIVE     Standing Status:   Future     Standing Expiration Date:   4/30/2021    PROSTATE SPECIFIC AG     Standing Status:   Future     Standing Expiration Date:   4/30/2021    TESTOSTERONE, FREE+TOTAL     Standing Status:   Future     Standing Expiration Date:   4/30/2021       Papa Kirkpatrick, [facility]  04/29/2020      I have assessed the patient independently and  agree with the full assessment as outlined.   Dell Sanders MD, [address]

## 2020-05-01 LAB
TESTOST FREE SERPL-MCNC: 5.4 PG/ML (ref 6.6–18.1)
TESTOST SERPL-MCNC: 246.8 NG/DL (ref 264–916)

## 2020-08-11 ENCOUNTER — HOSPITAL ENCOUNTER (OUTPATIENT)
Dept: INFUSION THERAPY | Age: 70
Discharge: HOME | End: 2020-08-11
Payer: MEDICARE

## 2020-08-11 ENCOUNTER — OFFICE VISIT (OUTPATIENT)
Dept: ONCOLOGY | Age: 70
End: 2020-08-11

## 2020-08-11 VITALS
DIASTOLIC BLOOD PRESSURE: 71 MMHG | OXYGEN SATURATION: 97 % | WEIGHT: 181.2 LBS | TEMPERATURE: 98.5 F | HEIGHT: 68 IN | SYSTOLIC BLOOD PRESSURE: 140 MMHG | HEART RATE: 58 BPM | BODY MASS INDEX: 27.46 KG/M2

## 2020-08-11 VITALS
OXYGEN SATURATION: 97 % | SYSTOLIC BLOOD PRESSURE: 140 MMHG | HEART RATE: 58 BPM | DIASTOLIC BLOOD PRESSURE: 71 MMHG | TEMPERATURE: 98.5 F

## 2020-08-11 DIAGNOSIS — C61 PROSTATE CANCER (HCC): ICD-10-CM

## 2020-08-11 DIAGNOSIS — C61 PROSTATE CANCER (HCC): Primary | ICD-10-CM

## 2020-08-11 LAB
ALBUMIN SERPL-MCNC: 3.8 G/DL (ref 3.4–5)
ALBUMIN/GLOB SERPL: 1 {RATIO} (ref 0.8–1.7)
ALP SERPL-CCNC: 66 U/L (ref 45–117)
ALT SERPL-CCNC: 21 U/L (ref 16–61)
ANION GAP SERPL CALC-SCNC: 4 MMOL/L (ref 3–18)
AST SERPL-CCNC: 22 U/L (ref 10–38)
BASOPHILS # BLD: 0.1 K/UL (ref 0–0.1)
BASOPHILS NFR BLD: 1 % (ref 0–3)
BILIRUB SERPL-MCNC: 1.1 MG/DL (ref 0.2–1)
BUN SERPL-MCNC: 13 MG/DL (ref 7–18)
BUN/CREAT SERPL: 12 (ref 12–20)
CALCIUM SERPL-MCNC: 8.9 MG/DL (ref 8.5–10.1)
CHLORIDE SERPL-SCNC: 104 MMOL/L (ref 100–111)
CO2 SERPL-SCNC: 30 MMOL/L (ref 21–32)
CREAT SERPL-MCNC: 1.08 MG/DL (ref 0.6–1.3)
DIFFERENTIAL METHOD BLD: ABNORMAL
EOSINOPHIL # BLD: 0 K/UL (ref 0–0.4)
EOSINOPHIL NFR BLD: 0 % (ref 0–5)
ERYTHROCYTE [DISTWIDTH] IN BLOOD BY AUTOMATED COUNT: 12.1 % (ref 11.6–14.5)
GLOBULIN SER CALC-MCNC: 3.8 G/DL (ref 2–4)
GLUCOSE SERPL-MCNC: 108 MG/DL (ref 74–99)
HCT VFR BLD AUTO: 42 % (ref 36–48)
HGB BLD-MCNC: 14.1 G/DL (ref 13–16)
LYMPHOCYTES # BLD: 2.3 K/UL (ref 0.8–3.5)
LYMPHOCYTES NFR BLD: 23 % (ref 20–51)
MCH RBC QN AUTO: 32.2 PG (ref 24–34)
MCHC RBC AUTO-ENTMCNC: 33.6 G/DL (ref 31–37)
MCV RBC AUTO: 95.9 FL (ref 74–97)
MONOCYTES # BLD: 0.6 K/UL (ref 0–1)
MONOCYTES NFR BLD: 6 % (ref 2–9)
NEUTS SEG # BLD: 6.8 K/UL (ref 1.8–8)
NEUTS SEG NFR BLD: 70 % (ref 42–75)
PLATELET # BLD AUTO: 210 K/UL (ref 135–420)
PLATELET COMMENTS,PCOM: ABNORMAL
PMV BLD AUTO: 9.9 FL (ref 9.2–11.8)
POTASSIUM SERPL-SCNC: 4 MMOL/L (ref 3.5–5.5)
PROT SERPL-MCNC: 7.6 G/DL (ref 6.4–8.2)
PSA SERPL-MCNC: 8.9 NG/ML (ref 0–4)
RBC # BLD AUTO: 4.38 M/UL (ref 4.7–5.5)
RBC MORPH BLD: ABNORMAL
SODIUM SERPL-SCNC: 138 MMOL/L (ref 136–145)
WBC # BLD AUTO: 9.8 K/UL (ref 4.6–13.2)

## 2020-08-11 PROCEDURE — 36415 COLL VENOUS BLD VENIPUNCTURE: CPT

## 2020-08-11 PROCEDURE — 85025 COMPLETE CBC W/AUTO DIFF WBC: CPT

## 2020-08-11 PROCEDURE — 84403 ASSAY OF TOTAL TESTOSTERONE: CPT

## 2020-08-11 PROCEDURE — 84153 ASSAY OF PSA TOTAL: CPT

## 2020-08-11 PROCEDURE — 80053 COMPREHEN METABOLIC PANEL: CPT

## 2020-08-11 RX ORDER — POLYETHYLENE GLYCOL-3350 AND ELECTROLYTES 236; 6.74; 5.86; 2.97; 22.74 G/274.31G; G/274.31G; G/274.31G; G/274.31G; G/274.31G
POWDER, FOR SOLUTION ORAL
COMMUNITY
Start: 2020-06-08 | End: 2022-08-22

## 2020-08-11 RX ORDER — METOCLOPRAMIDE 5 MG/1
TABLET ORAL
COMMUNITY
Start: 2020-06-08 | End: 2022-08-22

## 2020-08-11 RX ORDER — BISACODYL 5 MG
TABLET, DELAYED RELEASE (ENTERIC COATED) ORAL
COMMUNITY
Start: 2020-06-08 | End: 2022-08-22

## 2020-08-11 NOTE — PROGRESS NOTES
Hematology/Oncology Progress Note     Name: Kg Prado  Date: 20  : 1950     PCP: Rajni Haynes MD      Mr. Raymond Mcdowell  is a 69 y.o. -American man with a history of early stage prostate cancer, Srikanth 6 currently  on active surveillance.     Subjective:      Mr. Raymond Mcdowell is a 23-year-old -American man who has early stage Srikanth 6 score prostate cancer. He was being followed by  who retired. He is here today for follow up office visit. He states he has been doing well since his last office visit. He reports he is being seen by the [facility] in [place] on a regular basis as well. He denies any urinary symptoms. He denies fevers and infections. He denies fatigue, shortness of breath, and tiredness. He denies chest pain and dizziness. He denies pain or any discomfort. He does not have any concerns or complaints to report at this time. Denies headache, CP or SOB. He is not on any medications. Works-out daily.  All other points of Ros have been reviewed and were negative.      Past medical history, family history, and social history: these were reviewed and remain unchanged.         Past Medical History:   Diagnosis Date    Diabetes ([facility] [address]     Elevated prostate specific antigen (PSA)     Elevated PSA     Hematospermia     Normal body mass index (BMI)     [facility]    T1c, Srikanth 6, 1/12 cores, PSA 5.6, Dr. Levon Villareal Prostate cancer [facility])     Prostate induration     Sleep apnea 2016    uses cpap  Q nite     Past Surgical History:   Procedure Laterality Date    HX ORTHOPAEDIC Right 2017    Torn Rotator Cuff Right Shoulder    HX UROLOGICAL  2015    PNBx-TRUS Vol 40 cc's, [place] 6, 1/12 cores PSA 5.6, Dr. Liu Hippo History     Socioeconomic History    Marital status:      Spouse name: Not on file    Number of children: Not on file    Years of education: Not on file    Highest education level: Not on file   Tobacco Use  Smoking status: Former Smoker     Packs/day: 0.50     Years: 20.00     Pack years: 10.00     Last attempt to quit:      Years since quittin.6    Smokeless tobacco: Never Used    Tobacco comment: quit    Substance and Sexual Activity    Alcohol use: Yes     Alcohol/week: 4.0 standard drinks     Types: 4 Glasses of wine per week     Comment: socially    Drug use: No    Sexual activity: Yes     Partners: Female     Family History   Problem Relation Age of Onset    Diabetes Mother     Hypertension Mother     Cancer Mother         Liver?  Diabetes Father     Hypertension Father     Stroke Father     Diabetes Brother     Hypertension Brother        Current Outpatient Medications   Medication Sig Dispense Refill    GaviLyte-G 375-83.33-7.39 -5.86 gram suspension MIX AND DRINK UTD      bisacodyL (DULCOLAX) 5 mg EC tablet TK 1 T PO  ONCE D FOR 2 DAYS      metoclopramide HCl (REGLAN) 5 mg tablet TK 1 T PO 1 HOUR BEFORE COLON PREP      guaiFENesin-codeine (ROBITUSSIN AC) 100-10 mg/5 mL solution take 10 milliliter by oral route  qhs  as needed cough      cholecalciferol, VITAMIN D3, (VITAMIN D3) 5,000 unit tab tablet 1 tab QOD      ascorbic acid, vitamin C, (VITAMIN C) 500 mg tablet 1 tab  QD      ascorbic acid, vitamin C, (VITAMIN C) 500 mg tablet 500 mg.      multivitamin (ONE A DAY) tablet Take 1 Tab by mouth daily.          Allergies   Allergen Reactions    Aspirin Other (comments)       Review of Systems: As per HPI    Objective:  Visit Vitals  /71   Pulse (!) 58   Temp 98.5 °F (36.9 °C)   Ht 5' 8\" (1.727 m)   Wt 82.2 kg (181 lb 3.2 oz)   SpO2 97%   BMI 27.55 kg/m²         Physical Exam:   General appearance - alert, well appearing, and in no distress  Mental status - alert, oriented to person, place, and time  EYE-YANETH, EOMI  ENT-ENT exam normal, no neck nodes or sinus tenderness  Mouth - mucous membranes moist, pharynx normal without lesions  Neck - supple, no significant adenopathy   Chest - clear to auscultation, no wheezes, rales or rhonchi, symmetric air entry   Heart - normal rate and regular rhythm   Abdomen - soft, nontender, nondistended, no masses or organomegaly  Lymph- no adenopathy palpable  Ext-no pedal edema noted  Skin-Warm and dry. Neuro -alert, oriented, normal speech, no focal findings or movement disorder noted          Diagnostic Imaging     No results found for this or any previous visit. No results found for this or any previous visit. No results found for this or any previous visit. Lab Results  Lab Results   Component Value Date/Time    WBC 10.1 04/29/2020 03:28 PM    HGB 13.9 04/29/2020 03:28 PM    HCT 40.0 04/29/2020 03:28 PM    PLATELET 051 79/92/8015 03:28 PM    MCV 94.6 04/29/2020 03:28 PM       Lab Results   Component Value Date/Time    Sodium 140 04/29/2020 03:28 PM    Potassium 4.2 04/29/2020 03:28 PM    Chloride 105 04/29/2020 03:28 PM    CO2 26 04/29/2020 03:28 PM    Anion gap 9 04/29/2020 03:28 PM    Glucose 145 (H) 04/29/2020 03:28 PM    BUN 12 04/29/2020 03:28 PM    Creatinine 1.05 04/29/2020 03:28 PM    BUN/Creatinine ratio 11 (L) 04/29/2020 03:28 PM    GFR est AA >60 04/29/2020 03:28 PM    GFR est non-AA >60 04/29/2020 03:28 PM    Calcium 8.6 04/29/2020 03:28 PM    Alk. phosphatase 66 04/29/2020 03:28 PM    Protein, total 7.2 04/29/2020 03:28 PM    Albumin 3.9 04/29/2020 03:28 PM    Globulin 3.3 04/29/2020 03:28 PM    A-G Ratio 1.2 04/29/2020 03:28 PM    ALT (SGPT) 19 04/29/2020 03:28 PM   F/U with PCP for health [facility]  Assessment/Plan:    ICD-10-CM ICD-9-CM    1.  [facility]  C61 185 CBC WITH AUTOMATED DIFF      METABOLIC PANEL, COMPREHENSIVE      PSA, DIAGNOSTIC (PROSTATE SPECIFIC AG)      TESTOSTERONE, TOTAL, ADULT MALE     Orders Placed This Encounter    CBC WITH AUTOMATED DIFF     Standing Status:   Future     Standing Expiration Date:   2/26/8890    METABOLIC PANEL, COMPREHENSIVE     Standing Status:   Future Standing Expiration Date:   8/12/2021    PROSTATE SPECIFIC AG     Standing Status:   Future     Standing Expiration Date:   8/12/2021    TESTOSTERONE, TOTAL, ADULT MALE     Standing Status:   Future     Standing Expiration Date:   8/12/2021    GacuateLyte-G 236-22.74-6.74 -5.86 gram suspension     Sig: MIX AND DRINK UTD    bisacodyL (DULCOLAX) 5 mg EC tablet     Sig: TK 1 T PO  ONCE D FOR 2 DAYS    metoclopramide HCl (REGLAN) 5 mg tablet     Sig: TK 1 T PO 1 HOUR BEFORE COLON PREP   T1c adenocarcinoma the prostate, [place] score 6:  His PSA level on 01/29/2020 was 6.9ng/mL. On 4/29/2020, PSA was stable at 6.8, testosterone total 247, normal CBC and CMP. I will recheck labs today including PSA. He did not have any treatment so far and has been only on surveillance. Only wakes up 3/week, once/night to urinate. H/O cigarette smoking: smoked for about 20 years, 1 pack/day. Follow-up with PCP. Follow-up in 2 weeks virtual       routine labs ordered, have labs drawn prior to [address], call if any problems  There are no Patient Instructions on file for this visit.        Sun Hawk MD

## 2020-08-11 NOTE — PROGRESS NOTES
[facility] Progress Note    Date: 2020    Name: Cristel Mike    MRN: [id number]         : 1950    Peripheral Lab Draw      Mr. Amarilis Mcgraw to [facility], ambulatory at 1200 accompanied by self. Pt was assessed and education was provided. Mr. Linda Nina vitals were reviewed and patient was observed for 5 minutes prior to treatment. Visit Vitals  /71   Pulse (!) 58   Temp 98.5 °F (36.9 °C) (Oral)   SpO2 97%     Recent Results (from the past 12 hour(s))   METABOLIC PANEL, COMPREHENSIVE    Collection Time: 20 12:10 PM   Result Value Ref Range    Sodium 138 136 - 145 mmol/L    Potassium 4.0 3.5 - 5.5 mmol/L    Chloride 104 100 - 111 mmol/L    CO2 30 21 - 32 mmol/L    Anion gap 4 3.0 - 18 mmol/L    Glucose 108 (H) 74 - 99 mg/dL    BUN 13 7.0 - 18 MG/DL    Creatinine 1.08 0.6 - 1.3 MG/DL    BUN/Creatinine ratio 12 12 - 20      GFR est AA >60 >60 ml/min/1.73m2    GFR est non-AA >60 >60 ml/min/1.73m2    Calcium 8.9 8.5 - 10.1 MG/DL    Bilirubin, total 1.1 (H) 0.2 - 1.0 MG/DL    ALT (SGPT) 21 16 - 61 U/L    AST (SGOT) 22 10 - 38 U/L    Alk. phosphatase 66 45 - 117 U/L    Protein, total 7.6 6.4 - 8.2 g/dL    Albumin 3.8 3.4 - 5.0 g/dL    Globulin 3.8 2.0 - 4.0 g/dL    A-G Ratio 1.0 0.8 - 1.7     CBC WITH AUTOMATED DIFF    Collection Time: 20 12:10 PM   Result Value Ref Range    WBC 9.8 4.6 - 13.2 K/uL    RBC 4.38 (L) 4.70 - 5.50 M/uL    HGB 14.1 13.0 - 16.0 g/dL    HCT 42.0 36.0 - 48.0 %    MCV 95.9 74.0 - 97.0 FL    MCH 32.2 24.0 - 34.0 PG    MCHC 33.6 31.0 - 37.0 g/dL    RDW 12.1 11.6 - 14.5 %    PLATELET 551 573 - 239 K/uL    MPV 9.9 9.2 - 11.8 FL    NEUTROPHILS 70 42 - 75 %    LYMPHOCYTES 23 20 - 51 %    MONOCYTES 6 2 - 9 %    EOSINOPHILS 0 0 - 5 %    BASOPHILS 1 0 - 3 %    ABS. NEUTROPHILS 6.8 1.8 - 8.0 K/UL    ABS. LYMPHOCYTES 2.3 0.8 - 3.5 K/UL    ABS. MONOCYTES 0.6 0 - 1.0 K/UL    ABS. EOSINOPHILS 0.0 0.0 - 0.4 K/UL    ABS.  BASOPHILS 0.1 0.0 - 0.1 K/UL    PLATELET COMMENTS ADEQUATE PLATELETS      RBC COMMENTS ANISOCYTOSIS  1+        DF MANUAL         Blood obtained peripherally from left arm x 1 attempt with butterfly needle and sent to lab for Cbc w/diff, Cmp, Psa and Testosterone per written orders. No bleeding or hematoma noted at site. Gauze and coban applied. Mr. Marley Lao tolerated the phlebotomy, and had no complaints. Patient armband removed and shredded. Mr. Marley Lao was discharged from [place] [address] in stable condition at 1210.      Cruz Weir Phlebotomist PCT  August 11, 2020  3:37 PM

## 2020-08-12 LAB — TESTOST SERPL-MCNC: 321 NG/DL (ref 264–916)

## 2020-08-25 ENCOUNTER — NURSE NAVIGATOR (OUTPATIENT)
Dept: OTHER | Age: 70
End: 2020-08-25

## 2020-08-25 ENCOUNTER — VIRTUAL VISIT (OUTPATIENT)
Dept: ONCOLOGY | Age: 70
End: 2020-08-25

## 2020-08-25 DIAGNOSIS — C61 PROSTATE CANCER (HCC): Primary | ICD-10-CM

## 2020-08-25 DIAGNOSIS — Z72.0 TOBACCO ABUSE: ICD-10-CM

## 2020-08-25 NOTE — NURSE NAVIGATOR
In room with Dr. Shellie Naidu during VV, no complaints voiced at this time. F/U with Dr. Shellie Naidu VV in 3 months with labs prior to visit. F/U with urology and PCP, all questions answered.

## 2020-08-25 NOTE — PROGRESS NOTES
Susan Rivas is a 69 y.o. male who was seen by synchronous (real-time) audio- technology on 2020. Hematology/Oncology Progress Note     Name: Robbin Brown  Date: 20  : 1950     PCP: Rex Pizarro MD      Mr. Galeano  is a 69 y.o. -American man with a history of early stage prostate cancer, Srikanth 6 currently  on active surveillance    Assessment & Plan:   Diagnoses and all orders for this visit:    1. Prostate cancer ([facility] [address]    2. Tobacco abuse        The complexity of medical decision making for this visit is moderate       T1c adenocarcinoma the prostate, Srikanth score 6:  His PSA level on 2020 was 6.9ng/mL. He did not have any treatment so far and has been only on surveillance. On 2020, PSA was stable at 6.8, testosterone total 247. WBC 9.8, H&H 14.1/42.0, MCV 96, platelet 953. Testosterone 321, PSA 8.9, BUN 13, creatinine 1.08. His PSA velocity is about 2 ng/dL in about 4 months which is high. He says he is decreasing meat eating and alcohol consumption. *F/U with urology  *F/U with PCP    H/O cigarette smoking: smoked for about 20 years, 1 pack/day. Follow-up with PCP.      I spent at least 15 minutes on this visit with this established patient. 712  Subjective:   Mr. Jeanine Brown is a 65-year-old -American man who has early stage Srikanth 6 score prostate cancer. He was being followed by  who retired. He is here today for follow up office visit. He states he has been doing well since his last office visit. He reports he is being [facility] by [facility]  in [place] on a regular basis as well. He denies any urinary symptoms. He denies fevers and infections. He denies fatigue, shortness of breath, and tiredness. He denies chest pain and dizziness. He denies pain or any discomfort. He does not have any concerns or complaints to report at this time. Denies headache, CP or SOB. He is not on any medications. Still works-out daily.  All other points of Ros have been reviewed and were negative.      Past medical history, family history, and social history: these were reviewed and remain unchanged.          Prior to Admission medications    Medication Sig Start Date End Date Taking? Authorizing Provider   Brian [id number] -5.86 gram suspension MIX AND DRINK UTD 6/8/20   Provider, Historical   bisacodyL (DULCOLAX) 5 mg EC tablet TK 1 T PO  ONCE D FOR 2 DAYS 6/8/20   Provider, Historical   metoclopramide HCl (REGLAN) 5 mg tablet TK 1 T PO 1 HOUR BEFORE COLON PREP 6/8/20   Provider, Historical   guaiFENesin-codeine (ROBITUSSIN AC) 100-10 mg/5 mL solution take 10 milliliter by oral route  qhs  as needed cough 1/7/19   Provider, Historical   cholecalciferol, VITAMIN D3, (VITAMIN D3) 5,000 unit tab tablet 1 tab QOD 1/7/19   Provider, Historical   ascorbic acid, vitamin C, (VITAMIN C) 500 mg tablet 1 tab  QD 1/7/19   Provider, Historical   ascorbic acid, vitamin C, (VITAMIN C) 500 mg tablet 500 mg. Provider, Historical   multivitamin (ONE A DAY) tablet Take 1 Tab by mouth daily.     Provider, Historical     Patient Active Problem List   Diagnosis Code    Elevated prostate specific antigen (PSA) R97.20    Prostate induration N42.89    Prostate cancer ([facility] [address] C61    Acute upper respiratory infection, unspecified J06.9    Benign neoplasm of colon D12.6    Cardiac dysrhythmia I49.9    Disorder of bursae of shoulder region M75.50    Disorder of skin and subcutaneous tissue L98.9    EIC (epidermal inclusion cyst) L72.0    Elevated blood-pressure reading without diagnosis of hypertension R03.0    Foot pain M79.673    Impaired fasting glucose R73.01    Low back pain M54.5    Other abnormal glucose R73.09    Shoulder joint pain M25.519    Rash and other nonspecific skin eruption R21    Rotator cuff tear arthropathy of right shoulder M75.101, M12.811    Sebaceous cyst L72.3    Tobacco abuse Z72.0     Patient Active Problem List    Diagnosis Date Noted  Tobacco abuse 08/25/2020    Acute upper respiratory infection, unspecified 05/02/2019    Cardiac dysrhythmia 05/02/2019    Disorder of bursae of shoulder region 05/02/2019    Disorder of skin and subcutaneous tissue 05/02/2019    Low back pain 05/02/2019    Shoulder joint pain 05/02/2019    Rash and other nonspecific skin eruption 05/02/2019    Sebaceous cyst 05/02/2019    Foot pain 05/14/2018    Other abnormal glucose 05/12/2017    Rotator cuff tear arthropathy of right shoulder 05/12/2017    Prostate cancer ([facility] [address] 06/15/2015    Prostate induration 05/07/2015    Elevated prostate specific antigen (PSA) 01/21/2015    Benign neoplasm of colon 01/13/2015    EIC (epidermal inclusion cyst) 03/04/2014    Elevated blood-pressure reading without diagnosis of hypertension 12/14/2011    Impaired fasting glucose 12/14/2011     Current Outpatient Medications   Medication Sig Dispense Refill    GaviLyte-G 236-22.74-6.74 -5.86 gram suspension MIX AND DRINK UTD      bisacodyL (DULCOLAX) 5 mg EC tablet TK 1 T PO  ONCE D FOR 2 DAYS      metoclopramide HCl (REGLAN) 5 mg tablet TK 1 T PO 1 HOUR BEFORE COLON PREP      guaiFENesin-codeine (ROBITUSSIN AC) 100-10 mg/5 mL solution take 10 milliliter by oral route  qhs  as needed cough      cholecalciferol, VITAMIN D3, (VITAMIN D3) 5,000 unit tab tablet 1 tab QOD      ascorbic acid, vitamin C, (VITAMIN C) 500 mg tablet 1 tab  QD      ascorbic acid, vitamin C, (VITAMIN C) 500 mg tablet 500 mg.      multivitamin (ONE A DAY) tablet Take 1 Tab by mouth daily.        Allergies   Allergen Reactions    Aspirin Other (comments)     Past Medical History:   Diagnosis Date    Diabetes ([facility] [address]     Elevated prostate specific antigen (PSA)     Elevated PSA     Hematospermia     Normal body mass index (BMI)     Prostate cancer ([facility]) 05/07/2015    T1c, [place] 6, 1/12 cores, PSA 5.6, Dr. Kevyn Sanchez [facility]state [facility])     Prostate induration     Sleep apnea 2016    uses cpap  Q nite     Past Surgical History:   Procedure Laterality Date    HX ORTHOPAEDIC Right 2017    Torn Rotator Cuff Right Shoulder    HX UROLOGICAL  2015    PNBx-TRUS Vol 40 cc's, [place] 6,  cores PSA 5.6, Dr. Jacobs Borne      Family History   Problem Relation Age of Onset    Diabetes Mother     Hypertension Mother     Cancer Mother         Liver?  Diabetes Father     Hypertension Father     Stroke Father     Diabetes Brother     Hypertension Brother      Social History     Tobacco Use    Smoking status: Former Smoker     Packs/day: 0.50     Years: 20.00     Pack years: 10.00     Last attempt to quit:      Years since quittin.6    Smokeless tobacco: Never Used    Tobacco comment: quit \"s   Substance Use Topics    Alcohol use: Yes     Alcohol/week: 4.0 standard drinks     Types: 4 Glasses of wine per week     Comment: socially       ROS    Objective:   No flowsheet data found. General: alert, cooperative, no distress     Additional exam findings: We discussed the expected course, resolution and complications of the diagnosis(es) in detail. Medication risks, benefits, costs, interactions, and alternatives were discussed as indicated. I advised him to contact the office if his condition worsens, changes or fails to improve as anticipated. He expressed understanding with the diagnosis(es) and plan. Kimberly Ninfa, who was evaluated through a patient-initiated, synchronous (real-time) audio- encounter, and/or his healthcare decision maker, is aware that it is a billable service, with coverage as determined by his insurance carrier. He provided verbal consent to proceed: Yes, and patient identification was verified. It was conducted pursuant to the emergency declaration under the [address] [place] [address], [address] [place] [address] authority and the [employer] General Act. A caregiver was present when appropriate. Ability to conduct physical exam was limited. I was at home. The patient was at home.       Lucero Andrews MD

## 2020-08-25 NOTE — PROGRESS NOTES
Gay Sawyer is a 69 y.o. male presenting today for a follow-up appointment via Telehealth. Identified patient using two identifiers (full name and ). Patient denies any complaints. Chief Complaint   Patient presents with    Prostate Cancer       Current Outpatient Medications   Medication Sig    GaviLyte-G 236-22.74-6.74 -5.86 gram suspension MIX AND DRINK UTD    bisacodyL (DULCOLAX) 5 mg EC tablet TK 1 T PO  ONCE D FOR 2 DAYS    metoclopramide HCl (REGLAN) 5 mg tablet TK 1 T PO 1 HOUR BEFORE COLON PREP    guaiFENesin-codeine (ROBITUSSIN AC) 100-10 mg/5 mL solution take 10 milliliter by oral route  qhs  as needed cough    cholecalciferol, VITAMIN D3, (VITAMIN D3) 5,000 unit tab tablet 1 tab QOD    ascorbic acid, vitamin C, (VITAMIN C) 500 mg tablet 1 tab  QD    ascorbic acid, vitamin C, (VITAMIN C) 500 mg tablet 500 mg.    multivitamin (ONE A DAY) tablet Take 1 Tab by mouth daily. No current facility-administered medications for this visit. Fall Risk Assessment, last 12 mths 2020   Able to walk? Yes   Fall in past 12 months? No       3 most recent PHQ Screens 2020   Little interest or pleasure in doing things Not at all   Feeling down, depressed, irritable, or hopeless Not at all   Total Score PHQ 2 0       Abuse Screening Questionnaire 2019   Do you ever feel afraid of your partner? N   Are you in a relationship with someone who physically or mentally threatens you? N   Is it safe for you to go home? Y       1. Have you been to the ER, urgent care clinic since your last visit? Hospitalized since your last visit? No    2. Have you seen or consulted any other health care providers outside of the [address] [place] [address] since your last visit? Include any pap smears or colon screening.  No

## 2020-12-01 ENCOUNTER — HOSPITAL ENCOUNTER (OUTPATIENT)
Dept: INFUSION THERAPY | Age: 70
Discharge: HOME | End: 2020-12-01
Payer: MEDICARE

## 2020-12-01 VITALS
OXYGEN SATURATION: 98 % | SYSTOLIC BLOOD PRESSURE: 159 MMHG | HEART RATE: 58 BPM | DIASTOLIC BLOOD PRESSURE: 66 MMHG | TEMPERATURE: 98.3 F

## 2020-12-01 DIAGNOSIS — C61 PROSTATE CANCER (HCC): ICD-10-CM

## 2020-12-01 LAB
ALBUMIN SERPL-MCNC: 3.8 G/DL (ref 3.4–5)
ALBUMIN/GLOB SERPL: 1 {RATIO} (ref 0.8–1.7)
ALP SERPL-CCNC: 74 U/L (ref 45–117)
ALT SERPL-CCNC: 24 U/L (ref 16–61)
ANION GAP SERPL CALC-SCNC: 4 MMOL/L (ref 3–18)
AST SERPL-CCNC: 19 U/L (ref 10–38)
BASO+EOS+MONOS # BLD AUTO: 0.9 K/UL (ref 0–2.3)
BASO+EOS+MONOS NFR BLD AUTO: 9 % (ref 0.1–17)
BILIRUB SERPL-MCNC: 1.1 MG/DL (ref 0.2–1)
BUN SERPL-MCNC: 14 MG/DL (ref 7–18)
BUN/CREAT SERPL: 13 (ref 12–20)
CALCIUM SERPL-MCNC: 9.2 MG/DL (ref 8.5–10.1)
CHLORIDE SERPL-SCNC: 104 MMOL/L (ref 100–111)
CO2 SERPL-SCNC: 30 MMOL/L (ref 21–32)
CREAT SERPL-MCNC: 1.07 MG/DL (ref 0.6–1.3)
DIFFERENTIAL METHOD BLD: NORMAL
ERYTHROCYTE [DISTWIDTH] IN BLOOD BY AUTOMATED COUNT: 11.8 % (ref 11.5–14.5)
GLOBULIN SER CALC-MCNC: 3.7 G/DL (ref 2–4)
GLUCOSE SERPL-MCNC: 98 MG/DL (ref 74–99)
HCT VFR BLD AUTO: 41.3 % (ref 36–48)
HGB BLD-MCNC: 14.3 G/DL (ref 12–16)
LYMPHOCYTES # BLD: 2.4 K/UL (ref 1.1–5.9)
LYMPHOCYTES NFR BLD: 24 % (ref 14–44)
MCH RBC QN AUTO: 32.2 PG (ref 25–35)
MCHC RBC AUTO-ENTMCNC: 34.6 G/DL (ref 31–37)
MCV RBC AUTO: 93 FL (ref 78–102)
NEUTS SEG # BLD: 6.6 K/UL (ref 1.8–9.5)
NEUTS SEG NFR BLD: 67 % (ref 40–70)
PLATELET # BLD AUTO: 188 K/UL (ref 140–440)
POTASSIUM SERPL-SCNC: 3.8 MMOL/L (ref 3.5–5.5)
PROT SERPL-MCNC: 7.5 G/DL (ref 6.4–8.2)
PSA SERPL-MCNC: 8.2 NG/ML (ref 0–4)
RBC # BLD AUTO: 4.44 M/UL (ref 4.1–5.1)
SODIUM SERPL-SCNC: 138 MMOL/L (ref 136–145)
WBC # BLD AUTO: 9.9 K/UL (ref 4.5–13)

## 2020-12-01 PROCEDURE — 85025 COMPLETE CBC W/AUTO DIFF WBC: CPT

## 2020-12-01 PROCEDURE — 80053 COMPREHEN METABOLIC PANEL: CPT

## 2020-12-01 PROCEDURE — 84153 ASSAY OF PSA TOTAL: CPT

## 2020-12-01 PROCEDURE — 36415 COLL VENOUS BLD VENIPUNCTURE: CPT

## 2020-12-01 NOTE — PROGRESS NOTES
[facility] Progress Note    Date: 2020    Name: Jannette Pimentel    MRN: [id number]         : 1950    Peripheral Lab Draw      Mr. Renetta Regalado to [facility], ambulatory at 1005 accompanied by self. Pt was assessed and education was provided. Mr. Arleth Villa vitals were reviewed and patient was observed for 5 minutes prior to treatment. Visit Vitals  BP (!) 159/66 (BP 1 Location: Left arm, BP Patient Position: Sitting)   Pulse (!) 58   Temp 98.3 °F (36.8 °C)   SpO2 98%     Recent Results (from the past 12 hour(s))   METABOLIC PANEL, COMPREHENSIVE    Collection Time: 20 10:15 AM   Result Value Ref Range    Sodium 138 136 - 145 mmol/L    Potassium 3.8 3.5 - 5.5 mmol/L    Chloride 104 100 - 111 mmol/L    CO2 30 21 - 32 mmol/L    Anion gap 4 3.0 - 18 mmol/L    Glucose 98 74 - 99 mg/dL    BUN 14 7.0 - 18 MG/DL    Creatinine 1.07 0.6 - 1.3 MG/DL    BUN/Creatinine ratio 13 12 - 20      GFR est AA >60 >60 ml/min/1.73m2    GFR est non-AA >60 >60 ml/min/1.73m2    Calcium 9.2 8.5 - 10.1 MG/DL    Bilirubin, total 1.1 (H) 0.2 - 1.0 MG/DL    ALT (SGPT) 24 16 - 61 U/L    AST (SGOT) 19 10 - 38 U/L    Alk. phosphatase 74 45 - 117 U/L    Protein, total 7.5 6.4 - 8.2 g/dL    Albumin 3.8 3.4 - 5.0 g/dL    Globulin 3.7 2.0 - 4.0 g/dL    A-G Ratio 1.0 0.8 - 1.7     CBC WITH 3 PART DIFF    Collection Time: 20 10:15 AM   Result Value Ref Range    WBC 9.9 4.5 - 13.0 K/uL    RBC 4.44 4.10 - 5.10 M/uL    HGB 14.3 12.0 - 16.0 g/dL    HCT 41.3 36 - 48 %    MCV 93.0 78 - 102 FL    MCH 32.2 25.0 - 35.0 PG    MCHC 34.6 31 - 37 g/dL    RDW 11.8 11.5 - 14.5 %    PLATELET 347 286 - 483 K/uL    NEUTROPHILS 67 40 - 70 %    MIXED CELLS 9 0.1 - 17 %    LYMPHOCYTES 24 14 - 44 %    ABS. NEUTROPHILS 6.6 1.8 - 9.5 K/UL    ABS. MIXED CELLS 0.9 0.0 - 2.3 K/uL    ABS.  LYMPHOCYTES 2.4 1.1 - 5.9 K/UL    DF AUTOMATED         Blood obtained peripherally from left arm x 1 attempt with butterfly needle and sent to lab for Cbc w/diff, Cmp and Psa per written orders. No bleeding or hematoma noted at site. Gauze and coban applied. Mr. Kranthi Polanco tolerated the phlebotomy, and had no complaints. Patient armband removed and shredded. Mr. Kranthi Polanco was discharged from [place] [address] in stable condition at 1015.      Ligia Patel Phlebotomist PCT  December 1, 2020  2:35 PM

## 2020-12-07 ENCOUNTER — NURSE NAVIGATOR (OUTPATIENT)
Dept: OTHER | Age: 70
End: 2020-12-07

## 2020-12-07 ENCOUNTER — VIRTUAL VISIT (OUTPATIENT)
Age: 70
End: 2020-12-07
Payer: MEDICARE

## 2020-12-07 DIAGNOSIS — Z72.0 TOBACCO ABUSE: ICD-10-CM

## 2020-12-07 DIAGNOSIS — C61 PROSTATE CANCER (HCC): Primary | ICD-10-CM

## 2020-12-07 PROCEDURE — G8536 NO DOC ELDER MAL SCRN: HCPCS | Performed by: INTERNAL MEDICINE

## 2020-12-07 PROCEDURE — G8428 CUR MEDS NOT DOCUMENT: HCPCS | Performed by: INTERNAL MEDICINE

## 2020-12-07 PROCEDURE — G8432 DEP SCR NOT DOC, RNG: HCPCS | Performed by: INTERNAL MEDICINE

## 2020-12-07 PROCEDURE — 1101F PT FALLS ASSESS-DOCD LE1/YR: CPT | Performed by: INTERNAL MEDICINE

## 2020-12-07 PROCEDURE — G8419 CALC BMI OUT NRM PARAM NOF/U: HCPCS | Performed by: INTERNAL MEDICINE

## 2020-12-07 PROCEDURE — 99213 OFFICE O/P EST LOW 20 MIN: CPT | Performed by: INTERNAL MEDICINE

## 2020-12-07 PROCEDURE — 3017F COLORECTAL CA SCREEN DOC REV: CPT | Performed by: INTERNAL MEDICINE

## 2020-12-07 PROCEDURE — G0463 HOSPITAL OUTPT CLINIC VISIT: HCPCS | Performed by: INTERNAL MEDICINE

## 2020-12-07 NOTE — PROGRESS NOTES
Kimo Johnston is a 69 y.o. male who was seen by synchronous (real-time) audio- technology on 2020. Hematology/Oncology Progress Note     Name: Robbin Cabrera  Date: 20  : 1950     PCP: Amilcar Patel MD      Mr. Galeano  is a 69 y.o. -American man with a history of early stage prostate cancer, Srikanth 6 currently  on active surveillance    Assessment & Plan:   Diagnoses and all orders for this visit:    1. Prostate cancer ([facility] [address]    2. Tobacco abuse        The complexity of medical decision making for this visit is moderate       T1c adenocarcinoma the prostate, Srikanth score 6:  His PSA level on 2020 was 6.9ng/mL. He did not have any treatment so far and has been only on surveillance. On 2020, PSA was stable at 6.8, testosterone total 247. WBC 9.8, H&H 14.1/42.0, MCV 96, platelet 009. Testosterone 321, PSA 8.9, BUN 13, creatinine 1.08. Labs on 2020 showed normal BUN/creatinine, PSA 8.2, WBC 9.9, H&H 14.3/41.3, MCV 93.0, platelet 631. PSA is stable. *Continue follow-up. *F/U with urology as scheduled  *F/U with PCP as scheduled     H/O cigarette smoking: smoked for about 20 years, 1 pack/day. Follow-up with PCP.     RTC 4 months with repeat labs     I spent at least 15 minutes on this visit with this established patient. 712  Subjective:     Mr. Susan Cabrera is a 76-year-old -American man who has early stage Pfeifer 6 score prostate cancer. He was being followed by Charlett Mohs who retired. He is here today for follow up office visit. He states he has been doing well since his last office visit. He reports he is being seen by [facility] in [place] on a regular basis as well. He denies any urinary symptoms. He denies fevers and infections. He denies fatigue, shortness of breath, and tiredness. He denies chest pain and dizziness. He denies pain or any discomfort.  He does not have any concerns or complaints to report at this time. Denies headache, CP or SOB. He is not on any medications. Still works-out daily. Has a lot of stress lately he says. All other points of Ros have been reviewed and were negative.      Past medical history, family history, and social history: these were reviewed and remain unchanged.        Prior to Admission medications    Medication Sig Start Date End Date Taking? Authorizing Provider   Brian [id number] -5.86 gram suspension MIX AND DRINK UTD 6/8/20   Provider, Historical   bisacodyL (DULCOLAX) 5 mg EC tablet TK 1 T PO  ONCE D FOR 2 DAYS 6/8/20   Provider, Historical   metoclopramide HCl (REGLAN) 5 mg tablet TK 1 T PO 1 HOUR BEFORE COLON PREP 6/8/20   Provider, Historical   guaiFENesin-codeine (ROBITUSSIN AC) 100-10 mg/5 mL solution take 10 milliliter by oral route  qhs  as needed cough 1/7/19   Provider, Historical   cholecalciferol, VITAMIN D3, (VITAMIN D3) 5,000 unit tab tablet 1 tab QOD 1/7/19   Provider, Historical   ascorbic acid, vitamin C, (VITAMIN C) 500 mg tablet 1 tab  QD 1/7/19   Provider, Historical   ascorbic acid, vitamin C, (VITAMIN C) 500 mg tablet 500 mg. Provider, Historical   multivitamin (ONE A DAY) tablet Take 1 Tab by mouth daily.     Provider, Historical     Patient Active Problem List   Diagnosis Code    Elevated prostate specific antigen (PSA) R97.20    Prostate induration N42.89    Prostate cancer ([facility] [address] C61    Acute upper respiratory infection, unspecified J06.9    Benign neoplasm of colon D12.6    Cardiac dysrhythmia I49.9    Disorder of bursae of shoulder region M71.9    Disorder of skin and subcutaneous tissue L98.9    EIC (epidermal inclusion cyst) L72.0    Elevated blood-pressure reading without diagnosis of hypertension R03.0    Foot pain M79.673    Impaired fasting glucose R73.01    Low back pain M54.5    Other abnormal glucose R73.09    Shoulder joint pain M25.519    Rash and other nonspecific skin eruption R21    Rotator cuff tear arthropathy of right shoulder M75.101, M12.811    Sebaceous cyst L72.3    Tobacco abuse Z72.0     Patient Active Problem List    Diagnosis Date Noted    Tobacco abuse 08/25/2020    Acute upper respiratory infection, unspecified 05/02/2019    Cardiac dysrhythmia 05/02/2019    Disorder of bursae of shoulder region 05/02/2019    Disorder of skin and subcutaneous tissue 05/02/2019    Low back pain 05/02/2019    Shoulder joint pain 05/02/2019    Rash and other nonspecific skin eruption 05/02/2019    Sebaceous cyst 05/02/2019    Foot pain 05/14/2018    Other abnormal glucose 05/12/2017    Rotator cuff tear arthropathy of right shoulder 05/12/2017    Prostate cancer ([facility] [address] 06/15/2015    Prostate induration 05/07/2015    Elevated prostate specific antigen (PSA) 01/21/2015    Benign neoplasm of colon 01/13/2015    EIC (epidermal inclusion cyst) 03/04/2014    Elevated blood-pressure reading without diagnosis of hypertension 12/14/2011    Impaired fasting glucose 12/14/2011     Current Outpatient Medications   Medication Sig Dispense Refill    GaviLyte-G 236-22.74-6.74 -5.86 gram suspension MIX AND DRINK UTD      bisacodyL (DULCOLAX) 5 mg EC tablet TK 1 T PO  ONCE D FOR 2 DAYS      metoclopramide HCl (REGLAN) 5 mg tablet TK 1 T PO 1 HOUR BEFORE COLON PREP      guaiFENesin-codeine (ROBITUSSIN AC) 100-10 mg/5 mL solution take 10 milliliter by oral route  qhs  as needed cough      cholecalciferol, VITAMIN D3, (VITAMIN D3) 5,000 unit tab tablet 1 tab QOD      ascorbic acid, vitamin C, (VITAMIN C) 500 mg tablet 1 tab  QD      ascorbic acid, vitamin C, (VITAMIN C) 500 mg tablet 500 mg.      multivitamin (ONE A DAY) tablet Take 1 Tab by mouth daily.        Allergies   Allergen Reactions    Aspirin Other (comments)     Past Medical History:   Diagnosis Date    Diabetes ([facility] [address]     Elevated prostate specific antigen (PSA)     Elevated PSA     Hematospermia     Normal body mass index (BMI)     [facility]    T1c, [place] 6,  cores, PSA 5.6, Dr. Cesar Gentile Prostate cancer [facility])     Prostate induration     Sleep apnea 2016    uses cpap  Q nite     Past Surgical History:   Procedure Laterality Date    HX ORTHOPAEDIC Right 2017    Torn Rotator Cuff Right Shoulder    HX UROLOGICAL  2015    PNBx-TRUS Vol 40 cc's, [place] 6,  cores PSA 5.6, Dr. Miryam Giordano      Family History   Problem Relation Age of Onset    Diabetes Mother     Hypertension Mother     Cancer Mother         Liver?  Diabetes Father     Hypertension Father     Stroke Father     Diabetes Brother     Hypertension Brother      Social History     Tobacco Use    Smoking status: Former Smoker     Packs/day: 0.50     Years: 20.00     Pack years: 10.00     Last attempt to quit:      Years since quittin.9    Smokeless tobacco: Never Used    Tobacco comment: quit \"s   Substance Use Topics    Alcohol use: Yes     Alcohol/week: 4.0 standard drinks     Types: 4 Glasses of wine per week     Comment: socially       ROS: As per HPI    Objective:   No flowsheet data found. General: alert, cooperative, no distress     Additional exam findings: We discussed the expected course, resolution and complications of the diagnosis(es) in detail. Medication risks, benefits, costs, interactions, and alternatives were discussed as indicated. I advised him to contact the office if his condition worsens, changes or fails to improve as anticipated. He expressed understanding with the diagnosis(es) and plan. Jana Coelho, who was evaluated through a patient-initiated, synchronous (real-time) audio- encounter, and/or his healthcare decision maker, is aware that it is a billable service, with coverage as determined by his insurance carrier. He provided verbal consent to proceed: Yes, and patient identification was verified.  It was conducted pursuant to the emergency declaration under the [facility] [place], [address] waiver authority and the [employer] General Act. A caregiver was present when appropriate. Ability to conduct physical exam was limited. I was at home. The patient was at home.       Hector Islas MD

## 2020-12-07 NOTE — PROGRESS NOTES
Bebeto Zamora is a 69 y.o. male presenting today for a follow-up appointment via Telehealth. Identified patient using two identifiers (full name and ). Patient denies any complaints. Provider was advised. Chief Complaint   Patient presents with    Prostate Cancer       No flowsheet data found. Current Outpatient Medications   Medication Sig    cholecalciferol, VITAMIN D3, (VITAMIN D3) 5,000 unit tab tablet 1 tab QOD    ascorbic acid, vitamin C, (VITAMIN C) 500 mg tablet 500 mg.    multivitamin (ONE A DAY) tablet Take 1 Tab by mouth daily.  GaviLyte-G 236-22.74-6.74 -5.86 gram suspension MIX AND DRINK UTD    bisacodyL (DULCOLAX) 5 mg EC tablet TK 1 T PO  ONCE D FOR 2 DAYS    metoclopramide HCl (REGLAN) 5 mg tablet TK 1 T PO 1 HOUR BEFORE COLON PREP     No current facility-administered medications for this visit. Fall Risk Assessment, last 12 mths 2020   Able to walk? Yes   Fall in past 12 months? No       3 most recent PHQ Screens 2020   Little interest or pleasure in doing things Not at all   Feeling down, depressed, irritable, or hopeless Not at all   Total Score PHQ 2 0       Abuse Screening Questionnaire 2019   Do you ever feel afraid of your partner? N   Are you in a relationship with someone who physically or mentally threatens you? N   Is it safe for you to go home? Y       1. Have you been to the ER, urgent care clinic since your last visit? Hospitalized since your last visit? No    2. Have you seen or consulted any other health care providers outside of the [address] [place] [address] since your last visit? Include any pap smears or colon screening.  No

## 2020-12-07 NOTE — NURSE NAVIGATOR
In room with Dr. Anh Ham during virtual visit, no major concerns today. Pt on surveillance, RTC in 4 months with labs prior to visit. All questions answered.

## 2021-04-23 ENCOUNTER — HOSPITAL ENCOUNTER (OUTPATIENT)
Dept: INFUSION THERAPY | Age: 71
Discharge: HOME | End: 2021-04-23
Payer: MEDICARE

## 2021-04-23 DIAGNOSIS — C61 PROSTATE CANCER (HCC): ICD-10-CM

## 2021-04-23 LAB
ALBUMIN SERPL-MCNC: 3.9 G/DL (ref 3.4–5)
ALBUMIN/GLOB SERPL: 1.3 {RATIO} (ref 0.8–1.7)
ALP SERPL-CCNC: 68 U/L (ref 45–117)
ALT SERPL-CCNC: 19 U/L (ref 16–61)
ANION GAP SERPL CALC-SCNC: 4 MMOL/L (ref 3–18)
AST SERPL-CCNC: 19 U/L (ref 10–38)
BASOPHILS # BLD: 0 K/UL (ref 0–0.1)
BASOPHILS NFR BLD: 1 % (ref 0–2)
BILIRUB SERPL-MCNC: 1.2 MG/DL (ref 0.2–1)
BUN SERPL-MCNC: 12 MG/DL (ref 7–18)
BUN/CREAT SERPL: 12 (ref 12–20)
CALCIUM SERPL-MCNC: 9.1 MG/DL (ref 8.5–10.1)
CHLORIDE SERPL-SCNC: 104 MMOL/L (ref 100–111)
CO2 SERPL-SCNC: 32 MMOL/L (ref 21–32)
CREAT SERPL-MCNC: 0.98 MG/DL (ref 0.6–1.3)
DIFFERENTIAL METHOD BLD: ABNORMAL
EOSINOPHIL # BLD: 0.1 K/UL (ref 0–0.4)
EOSINOPHIL NFR BLD: 1 % (ref 0–5)
ERYTHROCYTE [DISTWIDTH] IN BLOOD BY AUTOMATED COUNT: 11.7 % (ref 11.6–14.5)
GLOBULIN SER CALC-MCNC: 3.1 G/DL (ref 2–4)
GLUCOSE SERPL-MCNC: 93 MG/DL (ref 74–99)
HCT VFR BLD AUTO: 39.9 % (ref 36–48)
HGB BLD-MCNC: 13.3 G/DL (ref 13–16)
LYMPHOCYTES # BLD: 2.4 K/UL (ref 0.9–3.6)
LYMPHOCYTES NFR BLD: 29 % (ref 21–52)
MCH RBC QN AUTO: 31.4 PG (ref 24–34)
MCHC RBC AUTO-ENTMCNC: 33.3 G/DL (ref 31–37)
MCV RBC AUTO: 94.1 FL (ref 74–97)
MONOCYTES # BLD: 0.8 K/UL (ref 0.05–1.2)
MONOCYTES NFR BLD: 9 % (ref 3–10)
NEUTS SEG # BLD: 5.1 K/UL (ref 1.8–8)
NEUTS SEG NFR BLD: 61 % (ref 40–73)
PLATELET # BLD AUTO: 209 K/UL (ref 135–420)
PMV BLD AUTO: 10.1 FL (ref 9.2–11.8)
POTASSIUM SERPL-SCNC: 4.3 MMOL/L (ref 3.5–5.5)
PROT SERPL-MCNC: 7 G/DL (ref 6.4–8.2)
PSA SERPL-MCNC: 8 NG/ML (ref 0–4)
RBC # BLD AUTO: 4.24 M/UL (ref 4.35–5.65)
SODIUM SERPL-SCNC: 140 MMOL/L (ref 136–145)
WBC # BLD AUTO: 8.4 K/UL (ref 4.6–13.2)

## 2021-04-23 PROCEDURE — 36415 COLL VENOUS BLD VENIPUNCTURE: CPT

## 2021-04-23 PROCEDURE — 85025 COMPLETE CBC W/AUTO DIFF WBC: CPT

## 2021-04-23 PROCEDURE — 84153 ASSAY OF PSA TOTAL: CPT

## 2021-04-23 PROCEDURE — 80053 COMPREHEN METABOLIC PANEL: CPT

## 2021-04-23 NOTE — PROGRESS NOTES
[facility] Progress Note    Date: 2021    Name: Lindsey Driver    MRN: [id number]         : 1950    Peripheral Lab Draw      Mr. Nery Plunkett to [facility], ambulatory at 1400 accompanied by self. Pt was assessed and education was provided. Blood obtained peripherally from left arm x 1 attempt with butterfly needle and sent to lab for Cbc w/diff, Cmp and Psa per written orders. No bleeding or hematoma noted at site. Gauze and coban applied. Mr. Nery Plunkett tolerated the phlebotomy, and had no complaints. Patient armband removed and shredded. Mr. Nery Plunkett was discharged from [place] [address] in stable condition at 1420.      Claudia Sebastian Phlebotomist PCT  2021  2:45 PM

## 2021-04-30 ENCOUNTER — VIRTUAL VISIT (OUTPATIENT)
Age: 71
End: 2021-04-30
Payer: MEDICARE

## 2021-04-30 DIAGNOSIS — C61 PROSTATE CANCER (HCC): Primary | ICD-10-CM

## 2021-04-30 DIAGNOSIS — Z72.0 TOBACCO ABUSE: ICD-10-CM

## 2021-04-30 PROCEDURE — 99442 PR PHYS/QHP TELEPHONE EVALUATION 11-20 MIN: CPT | Performed by: INTERNAL MEDICINE

## 2021-04-30 NOTE — PROGRESS NOTES
Gay Sawyer is a 66 y.o. male who was seen by synchronous (real-time) audio- technology on 2021. Hematology/Oncology Progress Note     Name: Robbin Hawthorne  Date: 21  : 1950    PCP: Cynthia Martin MD    Mr. Galeano  is a 69 y.o. -American man with a history of early stage prostate cancer, Srikanth 6 currently  on active surveillance    Assessment & Plan:   The complexity of medical decision making for this visit is      T1c adenocarcinoma the prostate, Srikanth score 6  --His PSA level on 2020 was 6.9ng/mL. He did not have any treatment so far and has been only on surveillance. --On 2021, PSA was stable at 8.0, WBC 8.4, H&H 13.3/39.9, MCV 94.1, platelet 711.    --PSA is stable. --Continue follow-up.  --F/U with Urology as scheduled  --F/U with PCP as scheduled     H/O cigarette smoking  --Smoked for about 20 years, 1 pack/day. Follow-up with PCP for tobacco cessation aid.     RTC 4 months with repeat labs or sooner if indicated     I spent at least 15 minutes on this visit with this established patient. 712  Subjective:     Mr. Gay Sawyer is a 66 y.o. -American male who was evaluated via audio-only technology. He was diagnosed with Andrew Goldberg prostate cancer. He states he has been doing well since his last office visit. He denies any urinary symptoms. He denies fevers and infections. He denies fatigue, shortness of breath, and tiredness. He denies chest pain and dizziness. Denies headache, chest pain or shortness of breath. He denies pain or any discomfort. He does not have any concerns or complaints to report at this time. He is not on any medications. Still works-out daily. All other points of ROS have been reviewed and were negative.      Past medical history, family history, and social history: these were reviewed and remain unchanged.        Prior to Admission medications    Medication Sig Start Date End Date Taking?  Authorizing Provider   Brian 359-40.38-5.25 -5.86 gram suspension MIX AND DRINK UTD 6/8/20   Provider, Historical   bisacodyL (DULCOLAX) 5 mg EC tablet TK 1 T PO  ONCE D FOR 2 DAYS 6/8/20   Provider, Historical   metoclopramide HCl (REGLAN) 5 mg tablet TK 1 T PO 1 HOUR BEFORE COLON PREP 6/8/20   Provider, Historical   cholecalciferol, VITAMIN D3, (VITAMIN D3) 5,000 unit tab tablet 1 tab QOD 1/7/19   Provider, Historical   ascorbic acid, vitamin C, (VITAMIN C) 500 mg tablet 500 mg. Provider, Historical   multivitamin (ONE A DAY) tablet Take 1 Tab by mouth daily.     Provider, Historical     Patient Active Problem List   Diagnosis Code    Elevated prostate specific antigen (PSA) R97.20    Prostate induration N42.89    Prostate cancer ([facility] C61    Acute upper respiratory infection, unspecified J06.9    Benign neoplasm of colon D12.6    Cardiac dysrhythmia I49.9    Disorder of bursae of shoulder region M71.9    Disorder of skin and subcutaneous tissue L98.9    EIC (epidermal inclusion cyst) L72.0    Elevated blood-pressure reading without diagnosis of hypertension R03.0    Foot pain M79.673    Impaired fasting glucose R73.01    Low back pain M54.5    Other abnormal glucose R73.09    Shoulder joint pain M25.519    Rash and other nonspecific skin eruption R21    Rotator cuff tear arthropathy of right shoulder M75.101, M12.811    Sebaceous cyst L72.3    Tobacco abuse Z72.0     Patient Active Problem List    Diagnosis Date Noted    Tobacco abuse 08/25/2020    Acute upper respiratory infection, unspecified 05/02/2019    Cardiac dysrhythmia 05/02/2019    Disorder of bursae of shoulder region 05/02/2019    Disorder of skin and subcutaneous tissue 05/02/2019    Low back pain 05/02/2019    Shoulder joint pain 05/02/2019    Rash and other nonspecific skin eruption 05/02/2019    Sebaceous cyst 05/02/2019    Foot pain 05/14/2018    Other abnormal glucose 05/12/2017    Rotator cuff tear arthropathy of right shoulder 2017    [facility]    Prostate induration 2015    Elevated prostate specific antigen (PSA) 2015    Benign neoplasm of colon 2015    EIC (epidermal inclusion cyst) 2014    Elevated blood-pressure reading without diagnosis of hypertension 2011    Impaired fasting glucose 2011     Current Outpatient Medications   Medication Sig Dispense Refill    GaviLyte-G 236-22.74-6.74 -5.86 gram suspension MIX AND DRINK UTD      bisacodyL (DULCOLAX) 5 mg EC tablet TK 1 T PO  ONCE D FOR 2 DAYS      metoclopramide HCl (REGLAN) 5 mg tablet TK 1 T PO 1 HOUR BEFORE COLON PREP      cholecalciferol, VITAMIN D3, (VITAMIN D3) 5,000 unit tab tablet 1 tab QOD      ascorbic acid, vitamin C, (VITAMIN C) 500 mg tablet 500 mg.      multivitamin (ONE A DAY) tablet Take 1 Tab by mouth daily. Allergies   Allergen Reactions    Aspirin Other (comments)     Past Medical History:   Diagnosis Date    [facility]     Elevated prostate specific antigen (PSA)     Elevated PSA     Hematospermia     Normal body mass index (BMI)     Prostate cancer (HCC) 2015    T1c, Srikanth 6, 1/12 cores, PSA 5.6, Dr. Betancur [place] Prostate cancer [facility])     Prostate induration     Sleep apnea 2016    uses cpap  Q nite     Past Surgical History:   Procedure Laterality Date    HX ORTHOPAEDIC Right 2017    Torn Rotator Cuff Right Shoulder    HX UROLOGICAL  2015    PNBx-TRUS Vol 40 cc's, Srikanth 6, 1/12 cores PSA 5.6, Dr. Vladimir rCoft      Family History   Problem Relation Age of Onset    Diabetes Mother     Hypertension Mother     Cancer Mother         Liver?     Diabetes Father     Hypertension Father     Stroke Father     Diabetes Brother     Hypertension Brother      Social History     Tobacco Use    Smoking status: Former Smoker     Packs/day: 0.50     Years: 20.00     Pack years: 10.00     Quit date:      Years since quittin.3    Smokeless tobacco: Never Used    Tobacco comment: quit 1980\"s   Substance Use Topics    Alcohol use: Yes     Alcohol/week: 4.0 standard drinks     Types: 4 Glasses of wine per week     Comment: socially       ROS: As per HPI    Objective:   No flowsheet data found. General: alert, cooperative, no distress     Labs:     Lab Results   Component Value Date/Time    WBC 8.4 04/23/2021 02:20 PM    HGB 13.3 04/23/2021 02:20 PM    HCT 39.9 04/23/2021 02:20 PM    PLATELET 046 79/22/9225 02:20 PM    MCV 94.1 04/23/2021 02:20 PM     Lab Results   Component Value Date/Time    Sodium 140 04/23/2021 02:20 PM    Potassium 4.3 04/23/2021 02:20 PM    Chloride 104 04/23/2021 02:20 PM    CO2 32 04/23/2021 02:20 PM    Anion gap 4 04/23/2021 02:20 PM    Glucose 93 04/23/2021 02:20 PM    BUN 12 04/23/2021 02:20 PM    Creatinine 0.98 04/23/2021 02:20 PM    BUN/Creatinine ratio 12 04/23/2021 02:20 PM    GFR est AA >60 04/23/2021 02:20 PM    GFR est non-AA >60 04/23/2021 02:20 PM    Calcium 9.1 04/23/2021 02:20 PM    Bilirubin, total 1.2 (H) 04/23/2021 02:20 PM    Alk. phosphatase 68 04/23/2021 02:20 PM    Protein, total 7.0 04/23/2021 02:20 PM    Albumin 3.9 04/23/2021 02:20 PM    Globulin 3.1 04/23/2021 02:20 PM    A-G Ratio 1.3 04/23/2021 02:20 PM    ALT (SGPT) 19 04/23/2021 02:20 PM    AST (SGOT) 19 04/23/2021 02:20 PM     Lab Results   Component Value Date/Time    Prostate Specific Ag 8.0 (H) 04/23/2021 02:20 PM    Prostate Specific Ag 8.2 (H) 12/01/2020 10:15 AM    Prostate Specific Ag 8.9 (H) 08/11/2020 12:10 PM       We discussed the expected course, resolution and complications of the diagnosis(es) in detail. Medication risks, benefits, costs, interactions, and alternatives were discussed as indicated. I advised him to contact the office if his condition worsens, changes or fails to improve as anticipated. He expressed understanding with the diagnosis(es) and plan.        Lanie Johnson, who was evaluated through a patient-initiated, synchronous (real-time) audio- encounter, and/or his healthcare decision maker, is aware that it is a billable service, with coverage as determined by his insurance carrier. He provided verbal consent to proceed: Yes, and patient identification was verified. It was conducted pursuant to the emergency declaration under the [facility], [address] [place] [address] authority and the [employer] General Act. A caregiver was present when appropriate. Ability to conduct physical exam was limited. I was in the office. The patient was at home.       Orders Placed This Encounter    TESTOSTERONE, FREE & TOTAL     Standing Status:   Future     Standing Expiration Date:   5/1/2022    CBC WITH AUTOMATED DIFF     Standing Status:   Future     Standing Expiration Date:   9/1/8162    METABOLIC PANEL, COMPREHENSIVE     Standing Status:   Future     Standing Expiration Date:   5/1/2022    PROSTATE SPECIFIC AG     Standing Status:   Future     Standing Expiration Date:   5/1/2022         Araceli Art MD  04/30/21

## 2021-08-30 ENCOUNTER — HOSPITAL ENCOUNTER (OUTPATIENT)
Dept: INFUSION THERAPY | Age: 71
Discharge: HOME | End: 2021-08-30
Payer: MEDICARE

## 2021-08-30 VITALS
OXYGEN SATURATION: 99 % | TEMPERATURE: 98.7 F | DIASTOLIC BLOOD PRESSURE: 64 MMHG | SYSTOLIC BLOOD PRESSURE: 135 MMHG | HEART RATE: 62 BPM

## 2021-08-30 DIAGNOSIS — C61 PROSTATE CANCER (HCC): ICD-10-CM

## 2021-08-30 LAB
ALBUMIN SERPL-MCNC: 3.7 G/DL (ref 3.4–5)
ALBUMIN/GLOB SERPL: 1 {RATIO} (ref 0.8–1.7)
ALP SERPL-CCNC: 69 U/L (ref 45–117)
ALT SERPL-CCNC: 21 U/L (ref 16–61)
ANION GAP SERPL CALC-SCNC: 3 MMOL/L (ref 3–18)
AST SERPL-CCNC: 24 U/L (ref 10–38)
BASO+EOS+MONOS # BLD AUTO: 0.6 K/UL (ref 0–2.3)
BASO+EOS+MONOS NFR BLD AUTO: 6 % (ref 0.1–17)
BILIRUB SERPL-MCNC: 1.7 MG/DL (ref 0.2–1)
BUN SERPL-MCNC: 15 MG/DL (ref 7–18)
BUN/CREAT SERPL: 14 (ref 12–20)
CALCIUM SERPL-MCNC: 8.9 MG/DL (ref 8.5–10.1)
CHLORIDE SERPL-SCNC: 105 MMOL/L (ref 100–111)
CO2 SERPL-SCNC: 29 MMOL/L (ref 21–32)
CREAT SERPL-MCNC: 1.11 MG/DL (ref 0.6–1.3)
DIFFERENTIAL METHOD BLD: ABNORMAL
ERYTHROCYTE [DISTWIDTH] IN BLOOD BY AUTOMATED COUNT: 11.8 % (ref 11.5–14.5)
GLOBULIN SER CALC-MCNC: 3.6 G/DL (ref 2–4)
GLUCOSE SERPL-MCNC: 149 MG/DL (ref 74–99)
HCT VFR BLD AUTO: 40.6 % (ref 36–48)
HGB BLD-MCNC: 13.5 G/DL (ref 12–16)
LYMPHOCYTES # BLD: 2 K/UL (ref 1.1–5.9)
LYMPHOCYTES NFR BLD: 22 % (ref 14–44)
MCH RBC QN AUTO: 31.5 PG (ref 25–35)
MCHC RBC AUTO-ENTMCNC: 33.3 G/DL (ref 31–37)
MCV RBC AUTO: 94.6 FL (ref 78–102)
NEUTS SEG # BLD: 6.3 K/UL (ref 1.8–9.5)
NEUTS SEG NFR BLD: 72 % (ref 40–70)
PLATELET # BLD AUTO: 197 K/UL (ref 140–440)
POTASSIUM SERPL-SCNC: 4.6 MMOL/L (ref 3.5–5.5)
PROT SERPL-MCNC: 7.3 G/DL (ref 6.4–8.2)
PSA SERPL-MCNC: 7.2 NG/ML (ref 0–4)
RBC # BLD AUTO: 4.29 M/UL (ref 4.1–5.1)
SODIUM SERPL-SCNC: 137 MMOL/L (ref 136–145)
WBC # BLD AUTO: 8.9 K/UL (ref 4.5–13)

## 2021-08-30 PROCEDURE — 36415 COLL VENOUS BLD VENIPUNCTURE: CPT

## 2021-08-30 PROCEDURE — 85025 COMPLETE CBC W/AUTO DIFF WBC: CPT

## 2021-08-30 PROCEDURE — 84403 ASSAY OF TOTAL TESTOSTERONE: CPT

## 2021-08-30 PROCEDURE — 80053 COMPREHEN METABOLIC PANEL: CPT

## 2021-08-30 PROCEDURE — 84153 ASSAY OF PSA TOTAL: CPT

## 2021-08-30 NOTE — PROGRESS NOTES
[facility] Progress Note    Date: 2021    Name: Lisa Vale    MRN: [id number]         : 1950    Peripheral Lab Draw      Mr. Lindsey Ramos to [facility], ambulatory at 1011 accompanied by self. Pt was assessed and education was provided. Mr. Munoz vitals were reviewed and patient was observed for 5 minutes prior to treatment. Visit Vitals  /64 (BP 1 Location: Left arm, BP Patient Position: Sitting)   Pulse 62   Temp 98.7 °F (37.1 °C)   SpO2 99%     Recent Results (from the past 12 hour(s))   CBC WITH 3 PART DIFF    Collection Time: 21 10:21 AM   Result Value Ref Range    WBC 8.9 4.5 - 13.0 K/uL    RBC 4.29 4. 10 - 5.10 M/uL    HGB 13.5 12.0 - 16.0 g/dL    HCT 40.6 36 - 48 %    MCV 94.6 78 - 102 FL    MCH 31.5 25.0 - 35.0 PG    MCHC 33.3 31 - 37 g/dL    RDW 11.8 11.5 - 14.5 %    PLATELET 654 783 - 850 K/uL    NEUTROPHILS 72 (H) 40 - 70 %    MIXED CELLS 6 0.1 - 17 %    LYMPHOCYTES 22 14 - 44 %    ABS. NEUTROPHILS 6.3 1.8 - 9.5 K/UL    ABS. MIXED CELLS 0.6 0.0 - 2.3 K/uL    ABS. LYMPHOCYTES 2.0 1.1 - 5.9 K/UL    DF AUTOMATED         Blood obtained peripherally from left arm x 1 attempt with butterfly needle and sent to lab for Cbc w/diff, Cmp, Psa and Testosterone  per written orders. No bleeding or hematoma noted at site. Gauze and coban applied. Mr. Lindsey Ramos tolerated the phlebotomy, and had no complaints. Patient armband removed and shredded. Mr. Lindsey Ramos was discharged from [place] [address] in stable condition at 1021.      Tamika Mcfadden Phlebotomist PCT  2021  1:29 PM

## 2021-09-02 LAB
TESTOST FREE SERPL-MCNC: 4.9 PG/ML (ref 6.6–18.1)
TESTOST SERPL-MCNC: 258 NG/DL (ref 264–916)

## 2021-09-07 ENCOUNTER — VIRTUAL VISIT (OUTPATIENT)
Age: 71
End: 2021-09-07
Payer: MEDICARE

## 2021-09-07 DIAGNOSIS — C61 PROSTATE CANCER (HCC): Primary | ICD-10-CM

## 2021-09-07 PROCEDURE — 99442 PR PHYS/QHP TELEPHONE EVALUATION 11-20 MIN: CPT | Performed by: INTERNAL MEDICINE

## 2021-09-07 NOTE — PROGRESS NOTES
Alfonzo Broderick is a 66 y.o. male who was seen by synchronous (real-time) audio- technology on 2021. Hematology/Oncology Progress Note     Name: Robbin Stephens  Date: 21  : 1950    PCP: Nany Bob MD    Mr. Galeano  is a 69 y.o. -American man with a history of early stage prostate cancer, Srikanth 6 currently  on active surveillance    Assessment & Plan:   The complexity of medical decision making for this visit is      T1c adenocarcinoma the prostate, Breeden score 6  --His PSA level on 2020 was 6.9ng/mL. He did not have any treatment so far and has been only on surveillance. --On 2021, PSA was stable at 8.0, WBC 8.4, H&H 13.3/39.9, MCV 94.1, platelet 614.    --PSA is stable at 7.2 as per latest labs  --Continue follow-up.  --F/U with Urology as scheduled  --F/U with PCP as scheduled  --F/U with PCP to check HBA1c     H/O cigarette smoking  --Smoked for about 20 years, 1 pack/day. Follow-up with PCP for tobacco cessation aid.     RTC 4 months with repeat labs or sooner if indicated     I spent at least 15 minutes on this visit with this established patient. 712  Subjective:     Mr. Alfonzo Broderick is a 66 y.o. -American male who was evaluated via audio-only technology. He was diagnosed with Jean Pierre Diane prostate cancer. He states he has been doing well since his last office visit. He denies any urinary symptoms. He denies fevers and infections. He denies fatigue, shortness of breath, and tiredness. He denies chest pain and dizziness. Denies headache, chest pain or shortness of breath. He denies pain or any discomfort. He does not have any concerns or complaints to report at this time. He is not on any medications. Still works-out daily.  All other points of ROS have been reviewed and were negative.      Past medical history, family history, and social history: these were reviewed and remain unchanged.        Prior to Admission medications Medication Sig Start Date End Date Taking? Authorizing Provider   Brian 006-41.05-6.21 -5.86 gram suspension MIX AND DRINK UTD 6/8/20   Provider, Historical   bisacodyL (DULCOLAX) 5 mg EC tablet TK 1 T PO  ONCE D FOR 2 DAYS 6/8/20   Provider, Historical   metoclopramide HCl (REGLAN) 5 mg tablet TK 1 T PO 1 HOUR BEFORE COLON PREP 6/8/20   Provider, Historical   cholecalciferol, VITAMIN D3, (VITAMIN D3) 5,000 unit tab tablet 1 tab QOD 1/7/19   Provider, Historical   ascorbic acid, vitamin C, (VITAMIN C) 500 mg tablet 500 mg. Provider, Historical   multivitamin (ONE A DAY) tablet Take 1 Tab by mouth daily.     Provider, Historical     Patient Active Problem List   Diagnosis Code    Elevated prostate specific antigen (PSA) R97.20    Prostate induration N42.89    Prostate cancer ([facility] C61    Acute upper respiratory infection, unspecified J06.9    Benign neoplasm of colon D12.6    Cardiac dysrhythmia I49.9    Disorder of bursae of shoulder region M71.9    Disorder of skin and subcutaneous tissue L98.9    EIC (epidermal inclusion cyst) L72.0    Elevated blood-pressure reading without diagnosis of hypertension R03.0    Foot pain M79.673    Impaired fasting glucose R73.01    Low back pain M54.5    Other abnormal glucose R73.09    Shoulder joint pain M25.519    Rash and other nonspecific skin eruption R21    Rotator cuff tear arthropathy of right shoulder M75.101, M12.811    Sebaceous cyst L72.3    Tobacco abuse Z72.0     Patient Active Problem List    Diagnosis Date Noted    Tobacco abuse 08/25/2020    Acute upper respiratory infection, unspecified 05/02/2019    Cardiac dysrhythmia 05/02/2019    Disorder of bursae of shoulder region 05/02/2019    Disorder of skin and subcutaneous tissue 05/02/2019    Low back pain 05/02/2019    Shoulder joint pain 05/02/2019    Rash and other nonspecific skin eruption 05/02/2019    Sebaceous cyst 05/02/2019    Foot pain 05/14/2018    Other abnormal glucose 05/12/2017    Rotator cuff tear arthropathy of right shoulder 05/12/2017    Prostate cancer ([facility] 06/15/2015    Prostate induration 05/07/2015    Elevated prostate specific antigen (PSA) 01/21/2015    Benign neoplasm of colon 01/13/2015    EIC (epidermal inclusion cyst) 03/04/2014    Elevated blood-pressure reading without diagnosis of hypertension 12/14/2011    Impaired fasting glucose 12/14/2011     Current Outpatient Medications   Medication Sig Dispense Refill    GaviLyte-G 236-22.74-6.74 -5.86 gram suspension MIX AND DRINK UTD      bisacodyL (DULCOLAX) 5 mg EC tablet TK 1 T PO  ONCE D FOR 2 DAYS      metoclopramide HCl (REGLAN) 5 mg tablet TK 1 T PO 1 HOUR BEFORE COLON PREP      cholecalciferol, VITAMIN D3, (VITAMIN D3) 5,000 unit tab tablet 1 tab QOD      ascorbic acid, vitamin C, (VITAMIN C) 500 mg tablet 500 mg.      multivitamin (ONE A DAY) tablet Take 1 Tab by mouth daily. Allergies   Allergen Reactions    Aspirin Other (comments)     Past Medical History:   Diagnosis Date    [facility]     Elevated prostate specific antigen (PSA)     Elevated PSA     Hematospermia     Normal body mass index (BMI)     Prostate cancer (HCC) 05/07/2015    T1c, Srikanth 6, 1/12 cores, PSA 5.6, Dr. Maddison Henning Prostate cancer [facility])     Prostate induration     Sleep apnea 2016    uses cpap  Q nite     Past Surgical History:   Procedure Laterality Date    HX ORTHOPAEDIC Right 05/2017    Torn Rotator Cuff Right Shoulder    HX UROLOGICAL  5/7/2015    PNBx-TRUS Vol 40 cc's, [place] 6, 1/12 cores PSA 5.6, Dr. Miguel Angel Pollack      Family History   Problem Relation Age of Onset    Diabetes Mother     Hypertension Mother     Cancer Mother         Liver?     Diabetes Father     Hypertension Father     Stroke Father     Diabetes Brother     Hypertension Brother      Social History     Tobacco Use    Smoking status: Former Smoker     Packs/day: 0.50     Years: 20.00     Pack years: 10.00     Quit date: 1989     Years since quittin.7    Smokeless tobacco: Never Used    Tobacco comment: quit \"s   Substance Use Topics    Alcohol use: Yes     Alcohol/week: 4.0 standard drinks     Types: 4 Glasses of wine per week     Comment: socially       ROS: As per HPI    Objective:   No flowsheet data found. General: alert, cooperative, no distress     Labs:     Lab Results   Component Value Date/Time    WBC 8.9 2021 10:21 AM    HGB 13.5 2021 10:21 AM    HCT 40.6 2021 10:21 AM    PLATELET 344  10:21 AM    MCV 94.6 2021 10:21 AM     Lab Results   Component Value Date/Time    Sodium 137 2021 10:21 AM    Potassium 4.6 2021 10:21 AM    Chloride 105 2021 10:21 AM    CO2 29 2021 10:21 AM    Anion gap 3 2021 10:21 AM    Glucose 149 (H) 2021 10:21 AM    BUN 15 2021 10:21 AM    Creatinine 1.11 2021 10:21 AM    BUN/Creatinine ratio 14 2021 10:21 AM    GFR est AA >60 2021 10:21 AM    GFR est non-AA >60 2021 10:21 AM    Calcium 8.9 2021 10:21 AM    Bilirubin, total 1.7 (H) 2021 10:21 AM    Alk. phosphatase 69 2021 10:21 AM    Protein, total 7.3 2021 10:21 AM    Albumin 3.7 2021 10:21 AM    Globulin 3.6 2021 10:21 AM    A-G Ratio 1.0 2021 10:21 AM    ALT (SGPT) 21 2021 10:21 AM    AST (SGOT) 24 2021 10:21 AM     Lab Results   Component Value Date/Time    Prostate Specific Ag 7.2 (H) 2021 10:21 AM    Prostate Specific Ag 8.0 (H) 2021 02:20 PM    Prostate Specific Ag 8.2 (H) 2020 10:15 AM       We discussed the expected course, resolution and complications of the diagnosis(es) in detail. Medication risks, benefits, costs, interactions, and alternatives were discussed as indicated. I advised him to contact the office if his condition worsens, changes or fails to improve as anticipated. He expressed understanding with the diagnosis(es) and plan.        Dilan Regan Dondra Boas, who was evaluated through a patient-initiated, synchronous (real-time) audio- encounter, and/or his healthcare decision maker, is aware that it is a billable service, with coverage as determined by his insurance carrier. He provided verbal consent to proceed: Yes, and patient identification was verified. It was conducted pursuant to the emergency declaration under the [address] [place] [address], [address] [place] [address] authority and the [employer] General Act. A caregiver was present when appropriate. Ability to conduct physical exam was limited. I was in the office. The patient was at home.       Orders Placed This Encounter    CBC WITH AUTOMATED DIFF     Standing Status:   Future     Standing Expiration Date:   9/0/9386    METABOLIC PANEL, COMPREHENSIVE     Standing Status:   Future     Standing Expiration Date:   9/8/2022    PROSTATE SPECIFIC AG     Standing Status:   Future     Standing Expiration Date:   9/8/2022         Robby Mcguire MD  09/07/21

## 2022-01-10 ENCOUNTER — HOSPITAL ENCOUNTER (OUTPATIENT)
Dept: INFUSION THERAPY | Age: 72
Discharge: HOME | End: 2022-01-10
Payer: MEDICARE

## 2022-01-10 DIAGNOSIS — C61 PROSTATE CANCER (HCC): ICD-10-CM

## 2022-01-10 LAB
ALBUMIN SERPL-MCNC: 3.8 G/DL (ref 3.4–5)
ALBUMIN/GLOB SERPL: 1.1 {RATIO} (ref 0.8–1.7)
ALP SERPL-CCNC: 70 U/L (ref 45–117)
ALT SERPL-CCNC: 25 U/L (ref 16–61)
ANION GAP SERPL CALC-SCNC: 5 MMOL/L (ref 3–18)
AST SERPL-CCNC: 19 U/L (ref 10–38)
BASOPHILS # BLD: 0.1 K/UL (ref 0–0.1)
BASOPHILS NFR BLD: 1 % (ref 0–2)
BILIRUB SERPL-MCNC: 0.9 MG/DL (ref 0.2–1)
BUN SERPL-MCNC: 12 MG/DL (ref 7–18)
BUN/CREAT SERPL: 11 (ref 12–20)
CALCIUM SERPL-MCNC: 9.4 MG/DL (ref 8.5–10.1)
CHLORIDE SERPL-SCNC: 101 MMOL/L (ref 100–111)
CO2 SERPL-SCNC: 32 MMOL/L (ref 21–32)
CREAT SERPL-MCNC: 1.13 MG/DL (ref 0.6–1.3)
DIFFERENTIAL METHOD BLD: NORMAL
EOSINOPHIL # BLD: 0.2 K/UL (ref 0–0.4)
EOSINOPHIL NFR BLD: 3 % (ref 0–5)
ERYTHROCYTE [DISTWIDTH] IN BLOOD BY AUTOMATED COUNT: 11.9 % (ref 11.6–14.5)
GLOBULIN SER CALC-MCNC: 3.4 G/DL (ref 2–4)
GLUCOSE SERPL-MCNC: 195 MG/DL (ref 74–99)
HCT VFR BLD AUTO: 42.1 % (ref 36–48)
HGB BLD-MCNC: 13.4 G/DL (ref 13–16)
IMM GRANULOCYTES # BLD AUTO: 0 K/UL (ref 0–0.04)
IMM GRANULOCYTES NFR BLD AUTO: 0 % (ref 0–0.5)
LYMPHOCYTES # BLD: 2.2 K/UL (ref 0.9–3.6)
LYMPHOCYTES NFR BLD: 24 % (ref 21–52)
MCH RBC QN AUTO: 30.7 PG (ref 24–34)
MCHC RBC AUTO-ENTMCNC: 31.8 G/DL (ref 31–37)
MCV RBC AUTO: 96.6 FL (ref 78–100)
MONOCYTES # BLD: 0.9 K/UL (ref 0.05–1.2)
MONOCYTES NFR BLD: 10 % (ref 3–10)
NEUTS SEG # BLD: 5.8 K/UL (ref 1.8–8)
NEUTS SEG NFR BLD: 63 % (ref 40–73)
NRBC # BLD: 0 K/UL (ref 0–0.01)
NRBC BLD-RTO: 0 PER 100 WBC
PLATELET # BLD AUTO: 228 K/UL (ref 135–420)
PMV BLD AUTO: 10 FL (ref 9.2–11.8)
POTASSIUM SERPL-SCNC: 4.2 MMOL/L (ref 3.5–5.5)
PROT SERPL-MCNC: 7.2 G/DL (ref 6.4–8.2)
PSA SERPL-MCNC: 9 NG/ML (ref 0–4)
RBC # BLD AUTO: 4.36 M/UL (ref 4.35–5.65)
SODIUM SERPL-SCNC: 138 MMOL/L (ref 136–145)
WBC # BLD AUTO: 9.3 K/UL (ref 4.6–13.2)

## 2022-01-10 PROCEDURE — 85025 COMPLETE CBC W/AUTO DIFF WBC: CPT

## 2022-01-10 PROCEDURE — 36415 COLL VENOUS BLD VENIPUNCTURE: CPT

## 2022-01-10 PROCEDURE — 84153 ASSAY OF PSA TOTAL: CPT

## 2022-01-10 PROCEDURE — 80053 COMPREHEN METABOLIC PANEL: CPT

## 2022-01-17 ENCOUNTER — VIRTUAL VISIT (OUTPATIENT)
Age: 72
End: 2022-01-17
Payer: MEDICARE

## 2022-01-17 ENCOUNTER — NURSE NAVIGATOR (OUTPATIENT)
Dept: OTHER | Age: 72
End: 2022-01-17

## 2022-01-17 DIAGNOSIS — C61 PROSTATE CANCER (HCC): Primary | ICD-10-CM

## 2022-01-17 DIAGNOSIS — R73.09 HIGH GLUCOSE LEVEL: ICD-10-CM

## 2022-01-17 DIAGNOSIS — Z72.0 TOBACCO ABUSE: ICD-10-CM

## 2022-01-17 PROCEDURE — 99443 PR PHYS/QHP TELEPHONE EVALUATION 21-30 MIN: CPT | Performed by: INTERNAL MEDICINE

## 2022-01-17 RX ORDER — METAPROTERENOL SULFATE 10 MG
TABLET ORAL
COMMUNITY
Start: 2020-02-06

## 2022-01-17 NOTE — NURSE NAVIGATOR
Called pt to coordinate appointment with Urologist, pt stated he will call be back in reference to urology referral, contact information provided.

## 2022-01-17 NOTE — PROGRESS NOTES
Jaylyn Mejia is a 66 y.o. male, evaluated via audio-only technology on 1/17/2022 for Prostate Cancer   history of stage II prostate cancer [place] score less then 6. Cancer diagnosed by biopsy 6 years ago at the [facility] in [place]. Patient has been followed by PSA and it is gradually rising most recently it was 9. Patient does not have a urologist .patient is feeling well. .. Assessment & Plan:   Patient is feeling very well and he believes that if he watches his diet and drinking his PSA will go down again. Furthermore his hemoglobin A1c result is blood glucose has been gradually increasing and again he thinks that that he will watch his diet better normalize. We shall repeat PSA in 3 weeks and at that time she has not had hemoglobin A1c. We shall also endeavor to identify a neurologist for the patient  12  Subjective:       Prior to Admission medications    Medication Sig Start Date End Date Taking? Authorizing Provider   nettle-pumpkin-saw palmLaramin 17 (Prostate Therapy) cap  2/6/20  Yes Provider, Historical   glucosam/chond-msm1/C/milind/bor (GLUCOSAMINE-CHOND-MSM COMPLEX PO) Take 1,500 mg by mouth daily. Yes Provider, Historical   cholecalciferol, VITAMIN D3, (VITAMIN D3) 5,000 unit tab tablet 1 tab QOD 1/7/19  Yes Provider, Historical   ascorbic acid, vitamin C, (VITAMIN C) 500 mg tablet 500 mg. Yes Provider, Historical   multivitamin (ONE A DAY) tablet Take 1 Tab by mouth daily.    Yes Provider, Historical   GaviLyte-G 864-57.78-2.78 -5.86 gram suspension MIX AND DRINK UTD 6/8/20   Provider, Historical   bisacodyL (DULCOLAX) 5 mg EC tablet TK 1 T PO  ONCE D FOR 2 DAYS 6/8/20   Provider, Historical   metoclopramide HCl (REGLAN) 5 mg tablet TK 1 T PO 1 HOUR BEFORE COLON PREP 6/8/20   Provider, Historical     Patient Active Problem List   Diagnosis Code    Elevated prostate specific antigen (PSA) R97.20    Prostate induration N42.89    Prostate cancer ([facility] [address] C61    Acute upper respiratory infection, unspecified J06.9    Benign neoplasm of colon D12.6    Cardiac dysrhythmia I49.9    Disorder of bursae of shoulder region M71.9    Disorder of skin and subcutaneous tissue L98.9    EIC (epidermal inclusion cyst) L72.0    Elevated blood-pressure reading without diagnosis of hypertension R03.0    Foot pain M79.673    Impaired fasting glucose R73.01    Low back pain M54.50    High glucose level R73.09    Shoulder joint pain M25.519    Rash and other nonspecific skin eruption R21    Rotator cuff tear arthropathy of right shoulder M75.101, M12.811    Sebaceous cyst L72.3    Tobacco abuse Z72.0     Current Outpatient Medications   Medication Sig Dispense Refill    nettle-pumpkin-saw palm-min 17 (Prostate Therapy) cap       glucosam/chond-msm1/C/milind/bor (GLUCOSAMINE-CHOND-MSM COMPLEX PO) Take 1,500 mg by mouth daily.  cholecalciferol, VITAMIN D3, (VITAMIN D3) 5,000 unit tab tablet 1 tab QOD      ascorbic acid, vitamin C, (VITAMIN C) 500 mg tablet 500 mg.      multivitamin (ONE A DAY) tablet Take 1 Tab by mouth daily.       GaviLyte-G 236-22.74-6.74 -5.86 gram suspension MIX AND DRINK UTD      bisacodyL (DULCOLAX) 5 mg EC tablet TK 1 T PO  ONCE D FOR 2 DAYS      metoclopramide HCl (REGLAN) 5 mg tablet TK 1 T PO 1 HOUR BEFORE COLON PREP       Allergies   Allergen Reactions    Aspirin Other (comments)     Past Medical History:   Diagnosis Date    Diabetes ([facility] [address]     Elevated prostate specific antigen (PSA)     Elevated PSA     Hematospermia     Normal body mass index (BMI)     [facility] 05/07/2015    T1c, [place] 6, 1/12 cores, PSA 5.6, Dr. Gopi Figueredo    Prostate cancer [facility])     Prostate induration     Sleep apnea 2016    uses cpap  Q nite         Patient-Reported Vitals 1/17/2022   Patient-Reported Weight [address], who was evaluated through a patient-initiated, synchronous (real-time) audio only encounter, and/or her healthcare decision maker, is aware that it is a billable service, with coverage as determined by his insurance carrier. He provided verbal consent to proceed: Yes. He has not had a related appointment within my department in the past 7 days or scheduled within the next 24 hours. On this date 01/17/2022 I have spent 26 minutes reviewing previous notes, test results and face to face (virtual) with the patient discussing the diagnosis and importance of compliance with the treatment plan as well as documenting on the day of the visit.     Juan Daniel Bailey MD

## 2022-02-07 ENCOUNTER — HOSPITAL ENCOUNTER (OUTPATIENT)
Dept: INFUSION THERAPY | Age: 72
Discharge: HOME | End: 2022-02-07
Payer: MEDICARE

## 2022-02-07 DIAGNOSIS — Z72.0 TOBACCO ABUSE: ICD-10-CM

## 2022-02-07 DIAGNOSIS — C61 PROSTATE CANCER (HCC): ICD-10-CM

## 2022-02-07 DIAGNOSIS — R73.09 HIGH GLUCOSE LEVEL: ICD-10-CM

## 2022-02-07 LAB
HBA1C MFR BLD: 6 % (ref 4.2–5.6)
PSA SERPL-MCNC: 8.2 NG/ML (ref 0–4)

## 2022-02-07 PROCEDURE — 36415 COLL VENOUS BLD VENIPUNCTURE: CPT

## 2022-02-07 PROCEDURE — 84153 ASSAY OF PSA TOTAL: CPT

## 2022-02-07 PROCEDURE — 83036 HEMOGLOBIN GLYCOSYLATED A1C: CPT

## 2022-02-07 NOTE — PROGRESS NOTES
[facility] Progress Note    Date: 2022    Name: Crystal Bhat    MRN: [id number]         : 1950    Peripheral Lab Draw      Mr. Naima Lai to [facility], ambulatory at 1410 accompanied by self. Pt was assessed and education was provided. Mr. Fabien Segovia vitals were reviewed and patient was observed for 5 minutes prior to treatment. There were no vitals taken for this visit. Blood obtained peripherally from left arm x 1 attempt with butterfly needle and sent to lab per written orders. No bleeding or hematoma noted at site. Gauze and coban applied. Mr. Naima Lai tolerated the phlebotomy, and had no complaints. Patient armband removed and shredded. Mr. Naima Lai was discharged from [place] [address] in stable condition at 1420.      Catie Milian Phlebotomist PCT  2022  2:32 PM

## 2022-02-14 ENCOUNTER — VIRTUAL VISIT (OUTPATIENT)
Age: 72
End: 2022-02-14
Payer: MEDICARE

## 2022-02-14 ENCOUNTER — NURSE NAVIGATOR (OUTPATIENT)
Dept: OTHER | Age: 72
End: 2022-02-14

## 2022-02-14 DIAGNOSIS — R73.09 HIGH GLUCOSE LEVEL: ICD-10-CM

## 2022-02-14 DIAGNOSIS — C61 PROSTATE CANCER (HCC): Primary | ICD-10-CM

## 2022-02-14 PROCEDURE — 99443 PR PHYS/QHP TELEPHONE EVALUATION 21-30 MIN: CPT | Performed by: INTERNAL MEDICINE

## 2022-02-14 RX ORDER — FLUOCINONIDE 0.5 MG/G
CREAM TOPICAL
COMMUNITY
Start: 2022-02-11

## 2022-02-14 NOTE — PROGRESS NOTES
Shweta Mayorga is a 66 y.o. male, evaluated via audio-only technology on 2/14/2022 for Prostate Cancer  Also elevated blood glucose. Assessment & Plan:   Patient modified his diet significantly. And over 3 weeks. His blood pressure normalized. However Hgb A1c is 6.0 it was 5 in the past he says. His PSA went down to 8.2 from 9.0     The patient will continue his improved diet. He will follow-up with primary care physician regarding the blood glucose. We shall attempt to identify a neurologist for the patient. We shall see the patient 6 months with blood tests including PSA and hemoglobin A1c. Patient had opportunity to ask questions which we answered. Patient is in agreement with this plan  12  Subjective:     History of stage II prostate cancer, Srik[place] score less then 6. Cancer diagnosed by biopsy 6 years ago at the [facility] in [place]. Patient has been followed by PSA and it is gradually rising most recently it was 9 now 8.2    Patient does not have a urologist we will attempt to find one. Patient is feeling well after improving his diet. Prior to Admission medications    Medication Sig Start Date End Date Taking? Authorizing Provider   fluocinoNIDE (LIDEX) 0.05 % topical cream APPLY CREAM TOPICALLY TO TO THE AFFECTED AREA TWICE DAILY 2/11/22   Provider, Historical   nettle-pumpkin-saw palm-min 17 (Prostate Therapy) cap  2/6/20   Provider, Historical   glucosam/chond-msm1/C/milind/bor (GLUCOSAMINE-CHOND-MSM COMPLEX PO) Take 1,500 mg by mouth daily.     Provider, Historical   GaviLyte-G 300-76.31-0.34 -5.86 gram suspension MIX AND DRINK UTD 6/8/20   Provider, Historical   bisacodyL (DULCOLAX) 5 mg EC tablet TK 1 T PO  ONCE D FOR 2 DAYS 6/8/20   Provider, Historical   metoclopramide HCl (REGLAN) 5 mg tablet TK 1 T PO 1 HOUR BEFORE COLON PREP 6/8/20   Provider, Historical   cholecalciferol, VITAMIN D3, (VITAMIN D3) 5,000 unit tab tablet 1 tab QOD 1/7/19   Provider, Historical   ascorbic acid, vitamin C, (VITAMIN C) 500 mg tablet 500 mg. Provider, Historical   multivitamin (ONE A DAY) tablet Take 1 Tab by mouth daily. Provider, Historical     Patient Active Problem List   Diagnosis Code    Elevated prostate specific antigen (PSA) R97.20    Prostate induration N42.89    Prostate cancer ([facility] [address] C61    Acute upper respiratory infection, unspecified J06.9    Benign neoplasm of colon D12.6    Cardiac dysrhythmia I49.9    Disorder of bursae of shoulder region M71.9    Disorder of skin and subcutaneous tissue L98.9    EIC (epidermal inclusion cyst) L72.0    Elevated blood-pressure reading without diagnosis of hypertension R03.0    Foot pain M79.673    Impaired fasting glucose R73.01    Low back pain M54.50    High glucose level R73.09    Shoulder joint pain M25.519    Rash and other nonspecific skin eruption R21    Rotator cuff tear arthropathy of right shoulder M75.101, M12.811    Sebaceous cyst L72.3    Tobacco abuse Z72.0     Current Outpatient Medications   Medication Sig Dispense Refill    fluocinoNIDE (LIDEX) 0.05 % topical cream APPLY CREAM TOPICALLY TO TO THE AFFECTED AREA TWICE DAILY      nettle-pumpkin-saw palm-min 17 (Prostate Therapy) cap       glucosam/chond-msm1/C/milind/bor (GLUCOSAMINE-CHOND-MSM COMPLEX PO) Take 1,500 mg by mouth daily.  GaviLyte-G 236-22.74-6.74 -5.86 gram suspension MIX AND DRINK UTD      bisacodyL (DULCOLAX) 5 mg EC tablet TK 1 T PO  ONCE D FOR 2 DAYS      metoclopramide HCl (REGLAN) 5 mg tablet TK 1 T PO 1 HOUR BEFORE COLON PREP      cholecalciferol, VITAMIN D3, (VITAMIN D3) 5,000 unit tab tablet 1 tab QOD      ascorbic acid, vitamin C, (VITAMIN C) 500 mg tablet 500 mg.      multivitamin (ONE A DAY) tablet Take 1 Tab by mouth daily.        Allergies   Allergen Reactions    Aspirin Other (comments)     Past Medical History:   Diagnosis Date    Diabetes ([facility] [address]     Elevated prostate specific antigen (PSA)     Elevated PSA     Hematospermia     Normal body mass index (BMI)     Prostate cancer ([facility] [address] 05/07/2015    T1c, [place] 6, 1/12 cores, PSA 5.6, Dr. Sarina Law [facility])     Prostate induration     Sleep apnea 2016    uses cpap  Q nite           Patient-Reported Vitals 2/14/2022   Patient-Reported Weight 171   Patient-Reported Systolic  003   Patient-Reported Diastolic 75       Inez Hughes, who was evaluated through a patient-initiated, synchronous (real-time) audio only encounter, and/or her healthcare decision maker, is aware that it is a billable service, with coverage as determined by his insurance carrier. He provided verbal consent to proceed: Yes. He has not had a related appointment within my department in the past 7 days or scheduled within the next 24 hours. On this date 02/14/2022 I have spent 29 minutes reviewing previous notes, test results and face to face (virtual) with the patient discussing the diagnosis and importance of compliance with the treatment plan as well as documenting on the day of the visit.     Harrison Maldonado MD

## 2022-03-18 PROBLEM — M75.101 ROTATOR CUFF TEAR ARTHROPATHY OF RIGHT SHOULDER: Status: ACTIVE | Noted: 2017-05-12

## 2022-03-18 PROBLEM — M12.811 ROTATOR CUFF TEAR ARTHROPATHY OF RIGHT SHOULDER: Status: ACTIVE | Noted: 2017-05-12

## 2022-03-18 PROBLEM — I49.9 CARDIAC DYSRHYTHMIA: Status: ACTIVE | Noted: 2019-05-02

## 2022-03-18 PROBLEM — M25.519 SHOULDER JOINT PAIN: Status: ACTIVE | Noted: 2019-05-02

## 2022-03-19 PROBLEM — R21 RASH AND OTHER NONSPECIFIC SKIN ERUPTION: Status: ACTIVE | Noted: 2019-05-02

## 2022-03-19 PROBLEM — L98.9 DISORDER OF SKIN AND SUBCUTANEOUS TISSUE: Status: ACTIVE | Noted: 2019-05-02

## 2022-03-19 PROBLEM — Z72.0 TOBACCO ABUSE: Status: ACTIVE | Noted: 2020-08-25

## 2022-03-19 PROBLEM — M79.673 FOOT PAIN: Status: ACTIVE | Noted: 2018-05-14

## 2022-03-19 PROBLEM — L72.3 SEBACEOUS CYST: Status: ACTIVE | Noted: 2019-05-02

## 2022-03-19 PROBLEM — R73.09 HIGH GLUCOSE LEVEL: Status: ACTIVE | Noted: 2017-05-12

## 2022-03-20 PROBLEM — M54.50 LOW BACK PAIN: Status: ACTIVE | Noted: 2019-05-02

## 2022-03-20 PROBLEM — J06.9 ACUTE UPPER RESPIRATORY INFECTION, UNSPECIFIED: Status: ACTIVE | Noted: 2019-05-02

## 2022-03-20 PROBLEM — M71.9 DISORDER OF BURSAE OF SHOULDER REGION: Status: ACTIVE | Noted: 2019-05-02

## 2022-08-15 ENCOUNTER — HOSPITAL ENCOUNTER (OUTPATIENT)
Dept: INFUSION THERAPY | Age: 72
Discharge: HOME | End: 2022-08-15
Payer: MEDICARE

## 2022-08-15 VITALS
SYSTOLIC BLOOD PRESSURE: 150 MMHG | TEMPERATURE: 97.3 F | HEART RATE: 58 BPM | OXYGEN SATURATION: 98 % | DIASTOLIC BLOOD PRESSURE: 70 MMHG

## 2022-08-15 DIAGNOSIS — C61 PROSTATE CANCER (HCC): Primary | ICD-10-CM

## 2022-08-15 DIAGNOSIS — E55.9 VITAMIN D DEFICIENCY: ICD-10-CM

## 2022-08-15 DIAGNOSIS — R73.09 HIGH GLUCOSE LEVEL: ICD-10-CM

## 2022-08-15 DIAGNOSIS — C61 PROSTATE CANCER (HCC): ICD-10-CM

## 2022-08-15 LAB
25(OH)D3 SERPL-MCNC: 41.5 NG/ML (ref 30–100)
ALBUMIN SERPL-MCNC: 3.9 G/DL (ref 3.4–5)
ALBUMIN/GLOB SERPL: 1.1 {RATIO} (ref 0.8–1.7)
ALP SERPL-CCNC: 74 U/L (ref 45–117)
ALT SERPL-CCNC: 24 U/L (ref 16–61)
ANION GAP SERPL CALC-SCNC: 2 MMOL/L (ref 3–18)
AST SERPL-CCNC: 21 U/L (ref 10–38)
BASO+EOS+MONOS # BLD AUTO: 0.8 K/UL (ref 0–2.3)
BASO+EOS+MONOS NFR BLD AUTO: 8 % (ref 0.1–17)
BILIRUB SERPL-MCNC: 0.8 MG/DL (ref 0.2–1)
BUN SERPL-MCNC: 13 MG/DL (ref 7–18)
BUN/CREAT SERPL: 12 (ref 12–20)
CALCIUM SERPL-MCNC: 9.1 MG/DL (ref 8.5–10.1)
CHLORIDE SERPL-SCNC: 105 MMOL/L (ref 100–111)
CO2 SERPL-SCNC: 31 MMOL/L (ref 21–32)
CREAT SERPL-MCNC: 1.07 MG/DL (ref 0.6–1.3)
DIFFERENTIAL METHOD BLD: NORMAL
ERYTHROCYTE [DISTWIDTH] IN BLOOD BY AUTOMATED COUNT: 12 % (ref 11.5–14.5)
EST. AVERAGE GLUCOSE BLD GHB EST-MCNC: 131 MG/DL
GLOBULIN SER CALC-MCNC: 3.6 G/DL (ref 2–4)
GLUCOSE SERPL-MCNC: 128 MG/DL (ref 74–99)
HBA1C MFR BLD: 6.2 % (ref 4.2–5.6)
HCT VFR BLD AUTO: 41.6 % (ref 36–48)
HGB BLD-MCNC: 13.5 G/DL (ref 12–16)
LYMPHOCYTES # BLD: 2.4 K/UL (ref 1.1–5.9)
LYMPHOCYTES NFR BLD: 23 % (ref 14–44)
MCH RBC QN AUTO: 31.3 PG (ref 25–35)
MCHC RBC AUTO-ENTMCNC: 32.5 G/DL (ref 31–37)
MCV RBC AUTO: 96.5 FL (ref 78–102)
NEUTS SEG # BLD: 7.3 K/UL (ref 1.8–9.5)
NEUTS SEG NFR BLD: 69 % (ref 40–70)
PLATELET # BLD AUTO: 229 K/UL (ref 140–440)
POTASSIUM SERPL-SCNC: 4.2 MMOL/L (ref 3.5–5.5)
PROT SERPL-MCNC: 7.5 G/DL (ref 6.4–8.2)
PSA SERPL-MCNC: 9 NG/ML (ref 0–4)
RBC # BLD AUTO: 4.31 M/UL (ref 4.1–5.1)
SODIUM SERPL-SCNC: 138 MMOL/L (ref 136–145)
WBC # BLD AUTO: 10.5 K/UL (ref 4.5–13)

## 2022-08-15 PROCEDURE — 85025 COMPLETE CBC W/AUTO DIFF WBC: CPT

## 2022-08-15 PROCEDURE — 84403 ASSAY OF TOTAL TESTOSTERONE: CPT

## 2022-08-15 PROCEDURE — 83036 HEMOGLOBIN GLYCOSYLATED A1C: CPT

## 2022-08-15 PROCEDURE — 36415 COLL VENOUS BLD VENIPUNCTURE: CPT

## 2022-08-15 PROCEDURE — 82306 VITAMIN D 25 HYDROXY: CPT

## 2022-08-15 PROCEDURE — 84153 ASSAY OF PSA TOTAL: CPT

## 2022-08-15 PROCEDURE — 80053 COMPREHEN METABOLIC PANEL: CPT

## 2022-08-15 NOTE — PROGRESS NOTES
[facility] Progress Note    Date: August 15, 2022    Name: Hector De Jesus    MRN: [id number]         : 1950    Peripheral Lab Draw      Mr. Sarita Lawrence to [facility], ambulatory at 1310 accompanied by self. Pt was assessed and education was provided. Mr. Cristiana Hutchinson vitals were reviewed and patient was observed for 5 minutes prior to treatment. Visit Vitals  BP (!) 150/70 (BP 1 Location: Right arm, BP Patient Position: Sitting)   Pulse (!) 58   Temp 97.3 °F (36.3 °C)   SpO2 98%     Recent Results (from the past 12 hour(s))   CBC WITH 3 PART DIFF    Collection Time: 08/15/22  1:20 PM   Result Value Ref Range    WBC 10.5 4.5 - 13.0 K/uL    RBC 4.31 4.10 - 5.10 M/uL    HGB 13.5 12.0 - 16.0 g/dL    HCT 41.6 36 - 48 %    MCV 96.5 78 - 102 FL    MCH 31.3 25.0 - 35.0 PG    MCHC 32.5 31 - 37 g/dL    RDW 12.0 11.5 - 14.5 %    PLATELET 645 651 - 511 K/uL    NEUTROPHILS 69 40 - 70 %    Mixed cells 8 0.1 - 17 %    LYMPHOCYTES 23 14 - 44 %    ABS. NEUTROPHILS 7.3 1.8 - 9.5 K/UL    ABS. MIXED CELLS 0.8 0.0 - 2.3 K/uL    ABS. LYMPHOCYTES 2.4 1.1 - 5.9 K/UL    DF AUTOMATED         Blood obtained peripherally from left arm x 1 attempt with butterfly needle and sent to lab per written orders. No bleeding or hematoma noted at site. Gauze and coban applied. Mr. Sarita Lawrence tolerated the phlebotomy, and had no complaints. Patient armband removed and shredded. Mr. Sarita Lawrence was discharged from [place] [address] in stable condition at 1320.      Lilli Solano Phlebotomist PCT  August 15, 2022  1:29 PM

## 2022-08-18 LAB
TESTOST FREE SERPL-MCNC: 4.3 PG/ML (ref 6.6–18.1)
TESTOST SERPL-MCNC: 247 NG/DL (ref 264–916)

## 2022-08-22 ENCOUNTER — OFFICE VISIT (OUTPATIENT)
Age: 72
End: 2022-08-22
Payer: MEDICARE

## 2022-08-22 VITALS
BODY MASS INDEX: 27.07 KG/M2 | SYSTOLIC BLOOD PRESSURE: 143 MMHG | WEIGHT: 178.6 LBS | TEMPERATURE: 97.7 F | OXYGEN SATURATION: 96 % | RESPIRATION RATE: 17 BRPM | HEIGHT: 68 IN | HEART RATE: 60 BPM | DIASTOLIC BLOOD PRESSURE: 74 MMHG

## 2022-08-22 DIAGNOSIS — R73.09 HIGH GLUCOSE LEVEL: ICD-10-CM

## 2022-08-22 DIAGNOSIS — Z72.0 TOBACCO ABUSE: ICD-10-CM

## 2022-08-22 DIAGNOSIS — C61 PROSTATE CANCER (HCC): Primary | ICD-10-CM

## 2022-08-22 PROCEDURE — 99214 OFFICE O/P EST MOD 30 MIN: CPT | Performed by: INTERNAL MEDICINE

## 2022-08-22 PROCEDURE — G0463 HOSPITAL OUTPT CLINIC VISIT: HCPCS | Performed by: INTERNAL MEDICINE

## 2022-08-22 PROCEDURE — 1123F ACP DISCUSS/DSCN MKR DOCD: CPT | Performed by: INTERNAL MEDICINE

## 2022-08-22 NOTE — PROGRESS NOTES
Hematology/Oncology  Progress Note    Name: Michelet Wheat  Date: 2022  : 1950  Primary Care Provider: Lauryn Cordero MD    Mr. Kemi Walton is a 67y.o. year old male with Prostate Cancer  Also elevated blood glucose. [place] PSA has had a slight increase to 9.0 form 8.2. I was 9.0 in the past as well. Patient will improve his dietary discipline and physical activity. He is still looking for a Urologist in [facility]. He will continue to do so. Current Therapy: surveilalnce    Subjective:     History of stage II prostate cancer, Srikanth score less then 6. Cancer diagnosed by biopsy 6 years ago at the [facility] center in [place]. Patient has been followed by PSA and it is gradually rising most recently it was 9 now 8.2     Patient does not have a urologist we will attempt to find one. Patient is feeling well after improving his diet. The past medical, surgical and social history has been reviewed and remains unchanged.    Past Medical History:   Diagnosis Date    Diabetes ([address]     Elevated prostate specific antigen (PSA)     Elevated PSA     Hematospermia     Normal body mass index (BMI)     Prostate cancer (HCC) 2015    T1c, Srikanth 6, 1/12 cores, PSA 5.6, Dr. Clarisa Cortez    Prostate cancer [facility])     Prostate induration     Sleep apnea 2016    uses cpap  Q nite     Past Surgical History:   Procedure Laterality Date    HX ORTHOPAEDIC Right 2017    Torn Rotator Cuff Right Shoulder    HX UROLOGICAL  2015    PNBx-TRUS Vol 40 cc's, [place] 6, 1/12 cores PSA 5.6, Dr. Papa Gómez History     Socioeconomic History    Marital status:      Spouse name: Not on file    Number of children: Not on file    Years of education: Not on file    Highest education level: Not on file   Occupational History    Not on file   Tobacco Use    Smoking status: Former     Packs/day: 0.50     Years: 20.00     Pack years: 10.00     Types: Cigarettes     Quit date:      Years since quittin.6    Smokeless tobacco: Never    Tobacco comments:     quit \"s   Substance and Sexual Activity    Alcohol use: Yes     Alcohol/week: 4.0 standard drinks     Types: 4 Glasses of wine per week     Comment: socially    Drug use: No    Sexual activity: Yes     Partners: Female   Other Topics Concern    Not on file   Social History Narrative    Not on file     Social Determinants of Health     Financial Resource Strain: Not on file   Food Insecurity: Not on file   Transportation Needs: Not on file   Physical Activity: Not on file   Stress: Not on file   Social Connections: Not on file   Intimate Partner Violence: Not on file   Housing Stability: Not on file     Family History   Problem Relation Age of Onset    Diabetes Mother     Hypertension Mother     Cancer Mother         Liver? Diabetes Father     Hypertension Father     Stroke Father     Diabetes Brother     Hypertension Brother      Current Outpatient Medications   Medication Sig Dispense Refill    fluocinoNIDE (LIDEX) 0.05 % topical cream APPLY CREAM TOPICALLY TO TO THE AFFECTED AREA TWICE DAILY      nettle-pumpkin-saw palm-min 17 (Prostate Therapy) cap       cholecalciferol, VITAMIN D3, (VITAMIN D3) 5,000 unit tab tablet 1 tab QOD      ascorbic acid, vitamin C, (VITAMIN C) 500 mg tablet 500 mg.      multivitamin (ONE A DAY) tablet Take 1 Tab by mouth daily. Review of Systems:    General :The patient has no complaints and there is no physical distress evident. Psychological : patient denies having any psychological symptoms such as hallucinations depression or anxiety. Ophthalmic:the patient denies having any visual impairment or eye discomfort. ENT: there are no abnormalities reported. Allergy and Immunology:the patient denies having any seasonal allergies or allergies to medications other than those already outlined above.      Hematological and Lymphatic: the patient denies having any bruising, bleeding or lymphadenopathy. Endocrine: the patient denies having any heat or cold intolerance. There is no history of diabetes or thyroid disorders. Breast: the patient denies having any history of breast mass or lumps. Respiratory:the patient denies having any cough, shortness of breath, or dyspnea on exertion. Cardiovascular: there are no complaints of chest pain, palpitations, chest pounding, or dyspnea on exertion. Gastrointestinal: the patient denies having nausea, emesis, diarrhea, constipation, or blood in the stool. Genito-Urinary: the patient denies having urinary urgency, frequency, or dysuria. Musculoskeletal: with the exception of mild arthralgias the patient has no other musculoskeletal complaints. Neurological:  denies having any numbness, tingling, or neurologic deficits. Dermatological: patient denies having any unexplained rash, skin ulcerations, or hives. Objective:     Visit Vitals  BP (!) 143/74   Pulse 60   Temp 97.7 °F (36.5 °C)   Resp 17   Ht 5' 8\" (1.727 m)   Wt 81 kg (178 lb 9.6 oz)   SpO2 96%   BMI 27.16 kg/m²     Pain Score: 2    Physical Exam:     ECO    General: Well appearing, in NAD    Psychologic: mood and affect are appropriate, no anxiety or depression noted    Skin: examination of the skin reveals no bruising, rash or petechiae    HEENT: Normocephalic, atraumatic. Conjunctiva and sclera are clear. Pupils are equal, round and reactive to light. EOMs are intact. Neck: supple without lymphadenopathy, JVD or thyromegaly    Lymphatics: no palpable cervical, supraclavicular, infraclavicular, axillary or inguinal lymphadenopathy    Lungs: clear breath sounds bilaterally, no rhonchi or wheezes noted    Heart: Regular rate and rhythm, S1-S2 noted. Abdomen: soft, non-tender, non-distended, no HSM,     Extremities: without clubbing, cyanosis or edema    Neurologic: no focal deficits, steady gait, Alert and oriented x 3.     Laboratory Data:     Results for orders placed or performed during the hospital encounter of 08/15/22   CBC WITH 3 PART DIFF     Status: None   Result Value Ref Range Status    WBC 10.5 4.5 - 13.0 K/uL Final    RBC 4.31 4.10 - 5.10 M/uL Final    HGB 13.5 12.0 - 16.0 g/dL Final    HCT 41.6 36 - 48 % Final    MCV 96.5 78 - 102 FL Final    MCH 31.3 25.0 - 35.0 PG Final    MCHC 32.5 31 - 37 g/dL Final    RDW 12.0 11.5 - 14.5 % Final    PLATELET 663 850 - 433 K/uL Final    NEUTROPHILS 69 40 - 70 % Final    Mixed cells 8 0.1 - 17 % Final    LYMPHOCYTES 23 14 - 44 % Final    ABS. NEUTROPHILS 7.3 1.8 - 9.5 K/UL Final    ABS. MIXED CELLS 0.8 0.0 - 2.3 K/uL Final    ABS. LYMPHOCYTES 2.4 1.1 - 5.9 K/UL Final     Comment: Test performed at [address] [place] [address] or Outpatient Infusion Center Location. Reviewed by Medical Director. DF AUTOMATED   Final       Patient Active Problem List   Diagnosis Code    Elevated prostate specific antigen (PSA) R97.20    Prostate induration N42.89    Prostate cancer ([facility] [address] C61    Acute upper respiratory infection, unspecified J06.9    Benign neoplasm of colon D12.6    Cardiac dysrhythmia I49.9    Disorder of bursae of shoulder region M71.9    Disorder of skin and subcutaneous tissue L98.9    EIC (epidermal inclusion cyst) L72.0    Elevated blood-pressure reading without diagnosis of hypertension R03.0    Foot pain M79.673    Impaired fasting glucose R73.01    Low back pain M54.50    High glucose level R73.09    Shoulder joint pain M25.519    Rash and other nonspecific skin eruption R21    Rotator cuff tear arthropathy of right shoulder M75.101, M12.811    Sebaceous cyst L72.3    Tobacco abuse Z72.0         Assessment:     1. Prostate cancer ([facility] [address]    2. Tobacco abuse    3. High glucose level        Plan:     Patient has no symptoms from his prior prostate cancer.   His PSA is still minimally elevated he will continue to search for a urologist.  Feels that if he gets his glucose and diet and blood pressure under better control his PSA will be better. This has been true for him in the past.  We will reevaluate the patient in 6 months with blood test in person  Patient is in agreement with this plan    Follow-up and Dispositions    Return in about 6 months (around 2/22/2023).         Orders Placed This Encounter    CBC WITH AUTOMATED DIFF     Standing Status:   Future     Standing Expiration Date:   2/49/5789    METABOLIC PANEL, COMPREHENSIVE     Standing Status:   Future     Standing Expiration Date:   8/23/2023    PROSTATE SPECIFIC AG     Standing Status:   Future     Standing Expiration Date:   8/23/2023    TESTOSTERONE, FREE & TOTAL     Standing Status:   Future     Standing Expiration Date:   8/23/2023    HGB A1C + GLYCOMARK     Standing Status:   Future     Standing Expiration Date:   8/23/2023       Robert Tsang MD  8/22/2022

## 2023-01-25 NOTE — NURSE NAVIGATOR
Called pt to assist with coordinating an appointment for a urologist at [facility]. Pt stated he needed the providers names so that he can review MD's bio prior to scheduling and appointment. A list of providers was faxed to patient for review. Awaiting call back from patient once he has chosen a provider.
yes

## 2023-02-03 DIAGNOSIS — Z72.0 TOBACCO ABUSE: ICD-10-CM

## 2023-02-03 DIAGNOSIS — C61 PROSTATE CANCER (HCC): Primary | ICD-10-CM

## 2023-02-05 DIAGNOSIS — Z72.0 TOBACCO ABUSE: ICD-10-CM

## 2023-02-05 DIAGNOSIS — C61 PROSTATE CANCER (HCC): Primary | ICD-10-CM

## 2023-02-06 DIAGNOSIS — Z72.0 TOBACCO ABUSE: ICD-10-CM

## 2023-02-06 DIAGNOSIS — C61 PROSTATE CANCER (HCC): Primary | ICD-10-CM

## 2023-02-07 DIAGNOSIS — Z72.0 TOBACCO ABUSE: ICD-10-CM

## 2023-02-07 DIAGNOSIS — C61 PROSTATE CANCER (HCC): Primary | ICD-10-CM

## 2023-02-22 ENCOUNTER — APPOINTMENT (OUTPATIENT)
Dept: INFUSION THERAPY | Age: 73
End: 2023-02-22